# Patient Record
Sex: MALE | Race: BLACK OR AFRICAN AMERICAN | Employment: UNEMPLOYED | URBAN - METROPOLITAN AREA
[De-identification: names, ages, dates, MRNs, and addresses within clinical notes are randomized per-mention and may not be internally consistent; named-entity substitution may affect disease eponyms.]

---

## 2017-11-28 ENCOUNTER — TRANSFERRED RECORDS (OUTPATIENT)
Dept: HEALTH INFORMATION MANAGEMENT | Facility: CLINIC | Age: 57
End: 2017-11-28

## 2018-02-19 ENCOUNTER — TRANSFERRED RECORDS (OUTPATIENT)
Dept: HEALTH INFORMATION MANAGEMENT | Facility: CLINIC | Age: 58
End: 2018-02-19

## 2018-11-15 ENCOUNTER — TRANSFERRED RECORDS (OUTPATIENT)
Dept: HEALTH INFORMATION MANAGEMENT | Facility: CLINIC | Age: 58
End: 2018-11-15

## 2019-01-11 ENCOUNTER — TRANSFERRED RECORDS (OUTPATIENT)
Dept: HEALTH INFORMATION MANAGEMENT | Facility: CLINIC | Age: 59
End: 2019-01-11

## 2019-01-11 ENCOUNTER — TELEPHONE (OUTPATIENT)
Dept: UROLOGY | Facility: CLINIC | Age: 59
End: 2019-01-11

## 2019-01-11 NOTE — TELEPHONE ENCOUNTER
ONCOLOGY INTAKE: Records Information      APPT INFORMATION:  Referring provider:  Corrina  Referring provider s clinic:  [facility]  Reason for visit/diagnosis:  Renal mass:    Were the records received with the referral (via [employer])? Complete    Has patient been seen for any external appt for this diagnosis (enter clinic/location)? Dx:Renal mass: Caller Willy from [facility] [phone] Ref by: Corrina [facility] No Bx No Imaging at [facility] 08/18 CT

## 2019-01-14 ENCOUNTER — PRE VISIT (OUTPATIENT)
Dept: UROLOGY | Facility: CLINIC | Age: 59
End: 2019-01-14

## 2019-01-14 NOTE — TELEPHONE ENCOUNTER
MEDICAL RECORDS REQUEST   [facility]  Urology Clinic  [address] [place] [address]  PHONE: [phone]  Fax: [phone]        FUTURE VISIT INFORMATION                                                   Paul Ramirez, : 1960 scheduled for future visit at [facility]    APPOINTMENT INFORMATION:    Date: 2019    Provider:  LENIN ELMORE    Reason for Visit/Diagnosis: CONSULT FOR RENAL MASS    REFERRAL INFORMATION:    Referring provider:  TAYO ZAVALA    Specialty: MD    Referring providers clinic:  [facility]    Clinic contact number: [phone];    RECORDS REQUESTED FOR VISIT                                                     NOTES  STATUS/DETAILS   OFFICE NOTE from referring provider  yes   OFFICE NOTE from other specialist  no   DISCHARGE SUMMARY from hospital  no   DISCHARGE REPORT from the ER  no   OPERATIVE REPORT  yes   MEDICATION LIST  yes       PRE-VISIT CHECKLIST      Record collection complete Yes   Appointment appropriately scheduled           (right time/right provider) Yes   MyChart activation If no, please explain IN PROCESS   Questionnaire complete If no, please explain IN PROCESS     Completed by: Mervat Mendez

## 2019-01-15 ENCOUNTER — TRANSFERRED RECORDS (OUTPATIENT)
Dept: HEALTH INFORMATION MANAGEMENT | Facility: CLINIC | Age: 59
End: 2019-01-15

## 2019-02-18 ENCOUNTER — OFFICE VISIT (OUTPATIENT)
Dept: UROLOGY | Facility: CLINIC | Age: 59
End: 2019-02-18
Payer: COMMERCIAL

## 2019-02-18 ENCOUNTER — ALLIED HEALTH/NURSE VISIT (OUTPATIENT)
Dept: UROLOGY | Facility: CLINIC | Age: 59
End: 2019-02-18
Payer: COMMERCIAL

## 2019-02-18 VITALS
SYSTOLIC BLOOD PRESSURE: 131 MMHG | HEART RATE: 54 BPM | DIASTOLIC BLOOD PRESSURE: 76 MMHG | BODY MASS INDEX: 23.49 KG/M2 | WEIGHT: 149.7 LBS | HEIGHT: 67 IN

## 2019-02-18 DIAGNOSIS — Z85.528 HX OF RENAL CELL CANCER: Primary | ICD-10-CM

## 2019-02-18 RX ORDER — AMLODIPINE BESYLATE 10 MG/1
10 TABLET ORAL EVERY MORNING
COMMUNITY
Start: 2016-12-05

## 2019-02-18 RX ORDER — DOCUSATE SODIUM 100 MG/1
100 CAPSULE, LIQUID FILLED ORAL EVERY MORNING
COMMUNITY
Start: 2016-12-05

## 2019-02-18 RX ORDER — ACETAMINOPHEN 325 MG/1
650 TABLET ORAL
COMMUNITY
Start: 2019-01-25 | End: 2019-03-08

## 2019-02-18 RX ORDER — CEFAZOLIN SODIUM 1 G/50ML
1 INJECTION, SOLUTION INTRAVENOUS SEE ADMIN INSTRUCTIONS
Status: CANCELLED | OUTPATIENT
Start: 2019-02-18

## 2019-02-18 RX ORDER — CARVEDILOL 12.5 MG/1
12.5 TABLET ORAL 2 TIMES DAILY WITH MEALS
COMMUNITY
Start: 2018-06-29

## 2019-02-18 RX ORDER — CITALOPRAM HYDROBROMIDE 20 MG/1
20 TABLET ORAL EVERY MORNING
COMMUNITY
Start: 2017-01-22

## 2019-02-18 RX ORDER — SIMVASTATIN 10 MG
10 TABLET ORAL EVERY MORNING
COMMUNITY
Start: 2016-12-05

## 2019-02-18 RX ORDER — LABETALOL 100 MG/1
100 TABLET, FILM COATED ORAL EVERY MORNING
Status: ON HOLD | COMMUNITY
Start: 2017-01-22 | End: 2019-03-22

## 2019-02-18 RX ORDER — TRAZODONE HYDROCHLORIDE 100 MG/1
100 TABLET ORAL
COMMUNITY
Start: 2016-12-05

## 2019-02-18 RX ORDER — ASPIRIN 81 MG/1
81 TABLET, CHEWABLE ORAL EVERY MORNING
COMMUNITY
Start: 2018-10-26

## 2019-02-18 RX ORDER — SENNOSIDES A AND B 8.6 MG/1
8.6 TABLET, FILM COATED ORAL
COMMUNITY
Start: 2019-01-16 | End: 2019-03-08

## 2019-02-18 RX ORDER — CEFAZOLIN SODIUM 2 G/50ML
2 SOLUTION INTRAVENOUS
Status: CANCELLED | OUTPATIENT
Start: 2019-02-18

## 2019-02-18 ASSESSMENT — MIFFLIN-ST. JEOR: SCORE: 1467.66

## 2019-02-18 ASSESSMENT — PAIN SCALES - GENERAL: PAINLEVEL: NO PAIN (0)

## 2019-02-18 NOTE — PROGRESS NOTES
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  Evaluation of the literature demonstrates that approximately 80% of enhancing renal masses turn out to be kidney cancer upon removal, while 20% are found to be benign.  Although certain features such as size, gender and tumor characteristics can change these risks.  Predominantly cystic masses tend to have a much lower risk of cancer than solid masses.    We discussed the role of renal mass biopsy including the fact that renal mass biopsy is very safe with current techniques (risk of tumor seeding far below 1%).  Though renal mass biopsy is usually quite accurate 90-95% of the time, it can be inaccurate and it can be non diagnostic.  Furthermore, the majority of patients find they just need to proceed to surgery anyway. Renal mass biopsy can be very useful to determine a possible cancer recurrence (if there was a history of a previous) and if the surgical risks are high due to comorbidities or previous extensive surgery.    We discussed the options for treatment of a localized kidney mass includin) active surveillance   2) thermal ablation   3) surgical extirpation      Surgical extirpation has the best track record with a nearly 99% success rate for T1a lesions/masses compared to 90% success with thermal ablation and is generally preferred.  Furthermore, thermal ablation is not optimal for larger masses (>3 cms) or centrally located masses.  Active surveillance is also a reasonable option when life expectancy is less than 5-7 years for small  renal masses.    Furthermore, we discussed the relative merits of partial nephrectomy vs. radical nephrectomy and the theoretic basis behind maximal nephron preservation.  There is some data suggesting there are long term survival advantages and equivalent cancer control with nephron sparing surgery.  We also discussed the advantages and disadvantages and roles of open surgery vs. laparoscopic (and Da Preeti assisted) surgery.    The anticipated post-operative course was explained, including an anticipated 1 night hospital stay.    Patient has decided he would like to proceed with Robotic Partial Nephrectomy - 30835, significant probably conversion to open given past significant surgical history. Will likely approach this retro.    The risks, benefits, alternatives, and personnel involved in the procedure were discussed.  All questions were answered.  A written informed consent will be finalized on the morning of the procedure.    Chief Complaint:  Solid Renal Mass     HPI:   Paul Ramirez is a very pleasant 58 year old male who presents with a history renal cancer and nephrectomy of a renal lesion/mass.  This was discovered incidentally.  Initially diagnosed about 7 month(s) ago.  The lesion is solid and enhancing.  The maximal dimension of the renal lesion is 1.8 centimeters and located on the left side. Does have a history of prostate cancer which was treated with surgical intervention in 2017 and right renal carcinoma which was also treated with surgical intervention in 2012. Denies any pain or hematuria.     The histologic subtype was Papillary.    ISUP Grade 3.    Pathologic Stage T1b see below*.    Maximal tumor dimension was 4.7 cm and 0.3 cm  Tumor thrombus level  not applicable.    Tumor necrosis present: No.  Sarcomatoid features present: No.  Patient is status post Radical Nephrectomy Open on 2012.   Tumor was on the right side.   Lymph Nodes Removed No.   Number of nodes removed 0, number of node  "positive for cancer 0.  Was the ipsilateral adrenal gland removed? No.  Any Chemotherapy? No.    he does have a history of previous abdominal or retroperitoneal surgery.  There is not a family history of renal cell cancer.  The patient Does have  a history of smoking and currently is smoking one pack every three days.     Review of Systems:   Pertinent items are noted in HPI or as below, remainder of complete ROS is negative.      Active Medications:   The patient denies any regular medication use.      Allergies:   The patient reports no known allergies.       Past Medical History:  Renal cancer  Prostate cancer     Past Surgical History:  Right nephrectomy.   Prostatectomy.    Family History:   No past pertinent family history.       Social History:   Smoker.      PHYSICAL EXAM  Patient is a 58 year old  male   Vitals: Blood pressure 131/76, pulse 54, height 1.702 m (5' 7\"), weight 67.9 kg (149 lb 11.2 oz).  General Appearance Adult: Alert, no acute distress, oriented  HENT: throat/mouth:normal, good dentition  Lungs: no respiratory distress, or pursed lip breathing  Heart: No obvious jugular venous distension present  Abdomen: Body mass index is 23.45 kg/m .  Musculoskeletal: extremities normal  Skin: Multiple previous healed surgical incisions on the abdomen including the midline, umbilical, right upper quadrant, and median sternally on the chest and right subcostal.   Neuro: Alert, oriented, speech and mentation normal  Psych: affect and mood normal  Gait: Normal  : deferred    Laboratory:   I personally reviewed all applicable laboratory and pathology data reviewed with patient    Imaging:   I personally viewed all applicable imaging and interpreted them and went over the results with the patient.  Mass/lesion details are as follows:     The mass/lesion is located in the Left side and is primarily located in the lower pole.  The tumor is also 50 % endophytic and exophytic.  The mass/lesion primarily lies on " the anterior surface.  With regard to the collecting system, the edge of the tumor arrives between 4-7 mm from the collecting system.    I spent 10 minutes reviewing outside and past medical records    Scribe Disclosure:   I, Ramiro Espinosa, am serving as a scribe to document services personally performed by Tyson Velasquez MD at this visit, based upon the provider's statements to me. All documentation has been reviewed by the aforementioned provider prior to being entered into the official medical record.     Portions of this medical record were completed by a scribe. UPON MY REVIEW AND AUTHENTICATION BY ELECTRONIC SIGNATURE, this confirms (a) I performed the applicable clinical services, and (b) the record is accurate.      Answers for HPI/ROS submitted by the patient on 2/18/2019   General Symptoms: No  Skin Symptoms: No  HENT Symptoms: No  EYE SYMPTOMS: No  HEART SYMPTOMS: No  LUNG SYMPTOMS: No  INTESTINAL SYMPTOMS: No  URINARY SYMPTOMS: No  REPRODUCTIVE SYMPTOMS: No  SKELETAL SYMPTOMS: No  BLOOD SYMPTOMS: No  NERVOUS SYSTEM SYMPTOMS: No  MENTAL HEALTH SYMPTOMS: No

## 2019-02-18 NOTE — NURSING NOTE
"Chief Complaint   Patient presents with     Consult For     renal mass       Blood pressure 131/76, pulse 54, height 1.702 m (5' 7\"), weight 67.9 kg (149 lb 11.2 oz). Body mass index is 23.45 kg/m .    There is no problem list on file for this patient.      Allergies   Allergen Reactions     Lisinopril Unknown       Current Outpatient Medications   Medication Sig Dispense Refill     acetaminophen (TYLENOL) 325 MG tablet Take 650 mg by mouth       amLODIPine (NORVASC) 10 MG tablet Take 10 mg by mouth       aspirin (ASA) 81 MG chewable tablet Take 81 mg by mouth       carvedilol (COREG) 12.5 MG tablet Take 12.5 mg by mouth       Cholecalciferol (VITAMIN D3) 1000 units CAPS Take 1,000 Units by mouth       citalopram (CELEXA) 20 MG tablet Take 20 mg by mouth       docusate sodium (DSS) 100 MG capsule Take 100 mg by mouth       labetalol (NORMODYNE) 100 MG tablet Take 50 mg by mouth       senna (SENOKOT) 8.6 MG tablet Take 8.6 mg by mouth       simvastatin (ZOCOR) 10 MG tablet Take 10 mg by mouth       traZODone (DESYREL) 100 MG tablet Take 100 mg by mouth       vitamin D3 (CHOLECALCIFEROL) 1000 units (25 mcg) tablet Take 1,000 Units by mouth         Social History     Tobacco Use     Smoking status: Current Every Day Smoker   Substance Use Topics     Alcohol use: Not on file     Drug use: Not on file       Shirin Harrison LPN  2/18/2019  10:39 AM     "

## 2019-02-18 NOTE — PROGRESS NOTES
Pre Op Teaching Flowsheet       Pre and Post op Patient Education  Relevant Diagnosis:  Renal mass  Surgical procedure:  Left robotic partial nephrectomy  Teaching Topic:  Pre and post op teaching  Person Involved in teaching: Paul Ramirez    Motivation Level:  Asks Questions: Yes  Eager to Learn:  Yes  Cooperative: Yes  Receptive (willing/able to accept information):  Yes    Patient demonstrates understanding of the following:  Date of surgery:  3/22/19  Location of surgery:  [address] [place] [address]  History and Physical and any other testing necessary prior to surgery: Yes  Required time line for completion of History and Physical and any pre-op testing: Yes    Patient demonstrates understanding of the following:  Pre-op bowel prep:  No  Pre-op showering/scrub information with PCMX Soap: Yes  Blood thinner medications discussed and when to stop (if applicable):  Yes      Infection Prevention:   Patient demonstrates understanding of the following:  Surgical procedure site care taught: Yes  Signs and symptoms of infection taught:  Yes      Post-op follow-up:  Discussed how to contact the hospital, nurse, and clinic scheduling staff if necessary.    Instructional materials used/given/mailed:  [facility] Booklet, post op teaching sheet, Map, Soap, and arrival/location information.    Surgical instructions packet given to patient in office:  Yes.

## 2019-02-18 NOTE — LETTER
2019       RE: Paul Ramirez  [address]  [place] [address]     Dear Colleague,    Thank you for referring your patient, Paul Ramirez, to the [facility] UROLOGY AND INST FOR PROSTATE AND UROLOGIC CANCERS at [facility]. Please see a copy of my visit note below.      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  Evaluation of the literature demonstrates that approximately 80% of enhancing renal masses turn out to be kidney cancer upon removal, while 20% are found to be benign.  Although certain features such as size, gender and tumor characteristics can change these risks.  Predominantly cystic masses tend to have a much lower risk of cancer than solid masses.    We discussed the role of renal mass biopsy including the fact that renal mass biopsy is very safe with current techniques (risk of tumor seeding far below 1%).  Though renal mass biopsy is usually quite accurate 90-95% of the time, it can be inaccurate and it can be non diagnostic.  Furthermore, the majority of patients find they just need to proceed to surgery anyway. Renal mass biopsy can be very useful to determine a possible cancer recurrence (if there was a history of a previous) and if the surgical risks are high due to comorbidities or previous extensive surgery.    We discussed the options for treatment of a localized kidney mass includin) active surveillance   2) thermal ablation   3) surgical extirpation      Surgical extirpation has the best track record with a  nearly 99% success rate for T1a lesions/masses compared to 90% success with thermal ablation and is generally preferred.  Furthermore, thermal ablation is not optimal for larger masses (>3 cms) or centrally located masses.  Active surveillance is also a reasonable option when life expectancy is less than 5-7 years for small renal masses.    Furthermore, we discussed the relative merits of partial nephrectomy vs. radical nephrectomy and the theoretic basis behind maximal nephron preservation.  There is some data suggesting there are long term survival advantages and equivalent cancer control with nephron sparing surgery.  We also discussed the advantages and disadvantages and roles of open surgery vs. laparoscopic (and Da Preeti assisted) surgery.    The anticipated post-operative course was explained, including an anticipated 1 night hospital stay.    Patient has decided he would like to proceed with Robotic Partial Nephrectomy - 48643, significant probably conversion to open given past significant surgical history. Will likely approach this retro.    The risks, benefits, alternatives, and personnel involved in the procedure were discussed.  All questions were answered.  A written informed consent will be finalized on the morning of the procedure.    Chief Complaint:  Solid Renal Mass     HPI:   Paul Ramirez is a very pleasant 58 year old male who presents with a history renal cancer and nephrectomy of a renal lesion/mass.  This was discovered incidentally.  Initially diagnosed about 7 month(s) ago.  The lesion is solid and enhancing.  The maximal dimension of the renal lesion is 1.8 centimeters and located on the left side. Does have a history of prostate cancer which was treated with surgical intervention in 2017 and right renal carcinoma which was also treated with surgical intervention in 2012. Denies any pain or hematuria.     The histologic subtype was Papillary.    ISUP Grade 3.    Pathologic Stage T1b see  "below*.    Maximal tumor dimension was 4.7 cm and 0.3 cm  Tumor thrombus level  not applicable.    Tumor necrosis present: No.  Sarcomatoid features present: No.  Patient is status post Radical Nephrectomy Open on 2012.   Tumor was on the right side.   Lymph Nodes Removed No.   Number of nodes removed 0, number of node positive for cancer 0.  Was the ipsilateral adrenal gland removed? No.  Any Chemotherapy? No.    he does have a history of previous abdominal or retroperitoneal surgery.  There is not a family history of renal cell cancer.  The patient Does have  a history of smoking and currently is smoking one pack every three days.     Review of Systems:   Pertinent items are noted in HPI or as below, remainder of complete ROS is negative.      Active Medications:   The patient denies any regular medication use.      Allergies:   The patient reports no known allergies.       Past Medical History:  Renal cancer  Prostate cancer     Past Surgical History:  Right nephrectomy.   Prostatectomy.    Family History:   No past pertinent family history.       Social History:   Smoker.      PHYSICAL EXAM  Patient is a 58 year old  male   Vitals: Blood pressure 131/76, pulse 54, height 1.702 m (5' 7\"), weight 67.9 kg (149 lb 11.2 oz).  General Appearance Adult: Alert, no acute distress, oriented  HENT: throat/mouth:normal, good dentition  Lungs: no respiratory distress, or pursed lip breathing  Heart: No obvious jugular venous distension present  Abdomen: Body mass index is 23.45 kg/m .  Musculoskeletal: extremities normal  Skin: Multiple previous healed surgical incisions on the abdomen including the midline, umbilical, right upper quadrant, and median sternally on the chest and right subcostal.   Neuro: Alert, oriented, speech and mentation normal  Psych: affect and mood normal  Gait: Normal  : deferred    Laboratory:   I personally reviewed all applicable laboratory and pathology data reviewed with patient    Imaging:   I " personally viewed all applicable imaging and interpreted them and went over the results with the patient.  Mass/lesion details are as follows:     The mass/lesion is located in the Left side and is primarily located in the lower pole.  The tumor is also 50 % endophytic and exophytic.  The mass/lesion primarily lies on the anterior surface.  With regard to the collecting system, the edge of the tumor arrives between 4-7 mm from the collecting system.    I spent 10 minutes reviewing outside and past medical records    Scribe Disclosure:   I, Ramiro Espinosa, am serving as a scribe to document services personally performed by Tyson Velasquez MD at this visit, based upon the provider's statements to me. All documentation has been reviewed by the aforementioned provider prior to being entered into the official medical record.     Portions of this medical record were completed by a scribe. UPON MY REVIEW AND AUTHENTICATION BY ELECTRONIC SIGNATURE, this confirms (a) I performed the applicable clinical services, and (b) the record is accurate.      Answers for HPI/ROS submitted by the patient on 2/18/2019   General Symptoms: No  Skin Symptoms: No  HENT Symptoms: No  EYE SYMPTOMS: No  HEART SYMPTOMS: No  LUNG SYMPTOMS: No  INTESTINAL SYMPTOMS: No  URINARY SYMPTOMS: No  REPRODUCTIVE SYMPTOMS: No  SKELETAL SYMPTOMS: No  BLOOD SYMPTOMS: No  NERVOUS SYSTEM SYMPTOMS: No  MENTAL HEALTH SYMPTOMS: No      Again, thank you for allowing me to participate in the care of your patient.      Sincerely,    Tyson Velasquez MD

## 2019-03-07 PROBLEM — M79.10 MUSCULAR PAIN: Status: ACTIVE | Noted: 2018-11-15

## 2019-03-07 PROBLEM — F17.200 TOBACCO USE DISORDER: Status: ACTIVE | Noted: 2017-04-27

## 2019-03-07 PROBLEM — R21 RASH AND NONSPECIFIC SKIN ERUPTION: Status: ACTIVE | Noted: 2018-11-15

## 2019-03-07 PROBLEM — D75.9 CYTOPENIA: Status: ACTIVE | Noted: 2017-11-24

## 2019-03-07 PROBLEM — I71.20 THORACIC AORTIC ANEURYSM WITHOUT RUPTURE (H): Status: ACTIVE | Noted: 2019-03-07

## 2019-03-07 PROBLEM — N52.31 ERECTILE DYSFUNCTION FOLLOWING RADICAL PROSTATECTOMY: Status: ACTIVE | Noted: 2017-11-24

## 2019-03-07 PROBLEM — G89.18 POST-OP PAIN: Status: ACTIVE | Noted: 2019-01-29

## 2019-03-07 PROBLEM — E78.2 MIXED HYPERLIPIDEMIA: Status: ACTIVE | Noted: 2017-03-29

## 2019-03-07 PROBLEM — Z96.0 HISTORY OF PENILE IMPLANT: Status: ACTIVE | Noted: 2019-01-18

## 2019-03-07 PROBLEM — G47.01 INSOMNIA DUE TO MEDICAL CONDITION: Status: ACTIVE | Noted: 2017-03-29

## 2019-03-07 PROBLEM — J95.821 ACUTE POSTOPERATIVE RESPIRATORY FAILURE (H): Status: ACTIVE | Noted: 2017-01-03

## 2019-03-07 PROBLEM — R63.4 WEIGHT LOSS: Status: ACTIVE | Noted: 2018-05-25

## 2019-03-07 PROBLEM — N17.9 AKI (ACUTE KIDNEY INJURY) (H): Status: ACTIVE | Noted: 2017-01-03

## 2019-03-08 ENCOUNTER — OFFICE VISIT (OUTPATIENT)
Dept: SURGERY | Facility: CLINIC | Age: 59
End: 2019-03-08
Payer: COMMERCIAL

## 2019-03-08 ENCOUNTER — ANESTHESIA EVENT (OUTPATIENT)
Dept: SURGERY | Facility: CLINIC | Age: 59
End: 2019-03-08
Payer: COMMERCIAL

## 2019-03-08 VITALS
HEIGHT: 67 IN | SYSTOLIC BLOOD PRESSURE: 150 MMHG | OXYGEN SATURATION: 100 % | TEMPERATURE: 98.1 F | DIASTOLIC BLOOD PRESSURE: 85 MMHG | WEIGHT: 154.2 LBS | HEART RATE: 54 BPM | RESPIRATION RATE: 18 BRPM | BODY MASS INDEX: 24.2 KG/M2

## 2019-03-08 DIAGNOSIS — C64.9 RENAL CELL CANCER (H): ICD-10-CM

## 2019-03-08 DIAGNOSIS — N28.89 RENAL MASS: ICD-10-CM

## 2019-03-08 DIAGNOSIS — N28.89 RENAL MASS: Primary | ICD-10-CM

## 2019-03-08 DIAGNOSIS — C64.9 RENAL CELL CANCER (H): Primary | ICD-10-CM

## 2019-03-08 DIAGNOSIS — Z01.818 PREOP EXAMINATION: ICD-10-CM

## 2019-03-08 DIAGNOSIS — Z85.528 HX OF RENAL CELL CANCER: ICD-10-CM

## 2019-03-08 LAB
ALBUMIN UR-MCNC: NEGATIVE MG/DL
ANION GAP SERPL CALCULATED.3IONS-SCNC: 5 MMOL/L (ref 3–14)
APPEARANCE UR: CLEAR
BILIRUB UR QL STRIP: NEGATIVE
BUN SERPL-MCNC: 18 MG/DL (ref 7–30)
CALCIUM SERPL-MCNC: 10.2 MG/DL (ref 8.5–10.1)
CHLORIDE SERPL-SCNC: 105 MMOL/L (ref 94–109)
CO2 SERPL-SCNC: 28 MMOL/L (ref 20–32)
COLOR UR AUTO: YELLOW
CREAT SERPL-MCNC: 1.04 MG/DL (ref 0.66–1.25)
ERYTHROCYTE [DISTWIDTH] IN BLOOD BY AUTOMATED COUNT: 16.7 % (ref 10–15)
GFR SERPL CREATININE-BSD FRML MDRD: 80 ML/MIN/{1.73_M2}
GLUCOSE SERPL-MCNC: 104 MG/DL (ref 70–99)
GLUCOSE UR STRIP-MCNC: NEGATIVE MG/DL
HCT VFR BLD AUTO: 36.8 % (ref 40–53)
HGB BLD-MCNC: 12.2 G/DL (ref 13.3–17.7)
HGB UR QL STRIP: NEGATIVE
KETONES UR STRIP-MCNC: NEGATIVE MG/DL
LEUKOCYTE ESTERASE UR QL STRIP: NEGATIVE
MCH RBC QN AUTO: 30.7 PG (ref 26.5–33)
MCHC RBC AUTO-ENTMCNC: 33.2 G/DL (ref 31.5–36.5)
MCV RBC AUTO: 93 FL (ref 78–100)
MUCOUS THREADS #/AREA URNS LPF: PRESENT /LPF
NITRATE UR QL: NEGATIVE
PH UR STRIP: 6 PH (ref 5–7)
PLATELET # BLD AUTO: 193 10E9/L (ref 150–450)
POTASSIUM SERPL-SCNC: 4.2 MMOL/L (ref 3.4–5.3)
RBC # BLD AUTO: 3.97 10E12/L (ref 4.4–5.9)
RBC #/AREA URNS AUTO: <1 /HPF (ref 0–2)
SODIUM SERPL-SCNC: 138 MMOL/L (ref 133–144)
SOURCE: ABNORMAL
SP GR UR STRIP: 1.01 (ref 1–1.03)
UROBILINOGEN UR STRIP-MCNC: 0 MG/DL (ref 0–2)
WBC # BLD AUTO: 5 10E9/L (ref 4–11)
WBC #/AREA URNS AUTO: <1 /HPF (ref 0–5)

## 2019-03-08 RX ORDER — ACETAMINOPHEN 500 MG
500 TABLET ORAL EVERY MORNING
COMMUNITY

## 2019-03-08 ASSESSMENT — LIFESTYLE VARIABLES: TOBACCO_USE: 1

## 2019-03-08 ASSESSMENT — MIFFLIN-ST. JEOR: SCORE: 1488.08

## 2019-03-08 NOTE — PATIENT INSTRUCTIONS
Preparing for Your Surgery      Name:  Paul Ramirez   MRN:  [id number]   :  1960   Today's Date:  3/8/2019     Arriving for surgery:  Surgery date:  2019  Arrival time: 5:30 am   Please come to:       [facility] [address]  [address] [place]  [address]    -   parking is available in front of the hospital from 5:15 am to 8:00 pm    -  Stop at the Information Desk in the lobby    -   Inform the information person that you are here for surgery. An escort to 3c will be provided. If you would not like an escort, please proceed to 3C on the 3rd floor. [phone]     What can I eat or drink?  -  You may have solid food or milk products until 8 hours prior to your surgery. (3/21/19 at 11 pm)  -  You may have water, apple juice or 7up/Sprite until 2 hours prior to your surgery. (until 5:30 am arrival time)    Which medicines can I take?        Stop Aspirin, vitamins and supplements one week prior to surgery.      Hold Ibuprofen for 24 hours and/or Naproxen for 48 hours prior to surgery.     -  Please take these medications the day of surgery:      All usual am prescription medications       How do I prepare myself?  -  Take two showers: one the night before surgery; and one the morning of surgery.         Use Scrubcare or Hibiclens to wash from neck down.  You may use your own shampoo and conditioner. No other hair products.   -  Do NOT use lotion, powder, deodorant, or antiperspirant the day of your surgery.  -  Do NOT wear any makeup, fingernail polish or jewelry.  - Do not bring your own medications to the hospital, except for inhalers and eye drops.  -  Bring your ID and insurance card.    Questions or Concerns:  -If you have questions or concerns regarding the day of surgery, please call [phone].       -For questions after surgery please call your surgeons office.           Influenza visitor restrictions are in force at this time.  The [facility] is  prohibiting the following visitors:  -Any sick persons regardless of age  -Anyone with known exposure to a communicable illness  -Anyone under the age of 5    AFTER YOUR SURGERY  Breathing exercises   Breathing exercises help you recover faster. Take deep breaths and let the air out slowly. This will:     Help you wake up after surgery.    Help prevent complications like pneumonia.  Preventing complications will help you go home sooner.   We may give you a breathing device (incentive spirometer) to encourage you to breathe deeply.   Nausea and vomiting   You may feel sick to your stomach after surgery; if so, let your nurse know.    Pain control:  After surgery, you may have pain. Our goal is to help you manage your pain. Pain medicine will help you feel comfortable enough to do activities that will help you heal.  These activities may include breathing exercises, walking and physical therapy.   To help your health care team treat your pain we will ask: 1) If you have pain  2) where it is located 3) describe your pain in your words  Methods of pain control include medications given by mouth, vein or by nerve block for some surgeries.  We may give you a pain control pump that will:  1) Deliver the medicine through a tube placed in your vein  2) Control the amount of medicine you receive  3) Allow you to push a button to deliver a dose of pain medicine  Sequential Compression Device (SCD) or Pneumo Boots:  You may need to wear SCD S on your legs or feet. These are wraps connected to a machine that pumps in air and releases it. The repeated pumping helps prevent blood clots from forming.

## 2019-03-08 NOTE — ANESTHESIA PREPROCEDURE EVALUATION
Anesthesia Pre-Procedure Evaluation    Patient: Paul Ramirez   MRN:     [id number] Gender:   male   Age:    58 year old :      1960        Preoperative Diagnosis: History Of Renal Cell Cancer   Procedure(s):  DaVinci Assisted Left Partial Nephrectomy     Past Medical History:   Diagnosis Date     Adrenal nodule (H) 2012    Overview:  Adrenal hypertrophy on right and adrenal nodule, 1.2 cm on left. Nonsecreting. Stable size 4/15- adrenal nodule stable with unchanged adrenal hyperplasia 9/15-MBD: Primary hyperparathyroidism with known parathyroid adenoma, normal bone density testing. He does not seem overtly interested in surgical treatments for this again given his DEXA and more mild hypercalcemia as of late, it woul     Alcohol abuse 2015    Last Assessment & Plan:  No alcohol use x 2 weeks, LFT's improved Will continue to hold naltrexone ( current short course of opioids for acute injury )      Carcinoma of kidney (H) 2012    Overview:  Overview:  Recommend chest imaging q 6 mos ( due ) and abdominal imaging q 2 years ( due )   Last Assessment & Plan:  -S/p R nephrectomy  -Had been on surveillance. Has had intermittent coccygeal pain and elevated LFTs in , which had then normalized. XR and CT negative for metastases. Today, denies coccygeal pain. No bone pains. CT in ,  and  negative for rec     Carcinoma of prostate (H) 2015    Overview:  S/p radical prostatectomy 11/30/15 with focally positive margins Urology f/u due    Last Assessment & Plan:  - Stage II (T2c, N0, M0), bear's 3+4,S/p radical prostatectomy 11/30/15. -Saw urology in 2017 with very slight increase in PSA.  Imaging at the time showed no local recurrence. Met with radiation oncology 2017 with a plan to continue to monitor PSA. -F/U with Dr. Altamirano     DM (diabetes mellitus) (H)      History of cocaine abuse      HTN (hypertension)      Hyperparathyroidism (H)      Renal  insufficiency      Renal mass      Thoracic aortic aneurysm without rupture (H) 3/7/2019     Tobacco dependence syndrome 1/17/2012      Past Surgical History:   Procedure Laterality Date     IR CVC TUNNEL PLACEMENT > 5 YRS OF AGE  2017     IR embolization left renal mass   2016     IR LUMBAR DRAIN PLACEMENT W FLUORO  2017     NEPHRECTOMY Right 2012     PROSTATECTOMY RETROPUBIC RADICAL  2016     Replacement of thoracoabdominal aorta  2007    [facility]     THORACENTESIS  2017          Anesthesia Evaluation     . Pt has had prior anesthetic. Type: General    No history of anesthetic complications          ROS/MED HX    ENT/Pulmonary:     (+)CHERIE risk factors hypertension, tobacco use, Current use 1 pack every 3 days packs/day  , . .    Neurologic:  - neg neurologic ROS     Cardiovascular: Comment: CT aorta: 1. Increasing size of an enhancing lesion in the anterior cortex of the left kidney, concerning for renal cell carcinoma.  2. No significant change in the appearance of the aorta, status post descending thoracic aortic aneurysm repair. Persistent chronic abdominal aortic dissection arising at the level of the diaphragm and extending into the left external iliac artery.  3. Stable postoperative changes from right nephrectomy and prostatectomy without evidence of significant localized recurrence.    Type 1 thoracic aortic aneurysm dissection repair with valve sparing procedure, and then in 2017 he had repair of distal dissection.     (+) Dyslipidemia, hypertension-Peripheral Vascular Disease-- Other, --. Taking blood thinners Pt has received instructions: Instructions Given to patient: Planned 7 day hold ASA for surgery. . . :. . Previous cardiac testing Echodate:2017results: 1. Technically limited exam.   2. Echo contrast was administrered to enhance visualization of all left ventricular segments.   3. Normal LV size, borderline wall thickness, normal global systolic function with an estimated EF of 55 - 60%.  Abnormal septal motion likely due to conduction.   4. Right ventricular cavity size is normal, global systolic RV function is normal.Stress Testdate:2016 results:1. No ischemia at the cardiac workload achieved.  2. Left ventricular function normal at rest, improved with stress.  3. No ischemic ECG response with adequate heart rate.  4. The patient did not report angina with stress.  5. Exercise capacity was fair .  6. The baseline echocardiogram revealed no significant valvular  abnormalities.  7. The PA systolic pressure was 29 mm Hg + RA at rest.ECG reviewed date:11/15/18 results:SUPRAVENTRICULAR BRADYCARDIAsimilar to 9/13 ECG  VOLTAGE CRITERIA FOR LVH (MEETS CRITERIA IN ONE OF: R(aVL), S(V1), R(V5), R(V5/V6)+S(V1))similar to 9/13 ECG  NONSPECIFIC T-WAVE ABNORMALITYsimilar to 9/13 ECG   date: results:          METS/Exercise Tolerance:  4 - Raking leaves, gardening   Hematologic:     (+) History of Transfusion no previous transfusion reaction -      Musculoskeletal:  - neg musculoskeletal ROS       GI/Hepatic:     (+) liver disease (steato),       Renal/Genitourinary:     (+) chronic renal disease, type: CRI, Pt does not require dialysis, Pt has no history of transplant,       Endo: Comment: 1* hyperparathyroid    (+) type II DM (Diet controlled) Last HgA1c: 4.8 date: 10/29/18 Not using insulin - not using insulin pump .      Psychiatric:     (+) psychiatric history depression      Infectious Disease:  - neg infectious disease ROS       Malignancy:   (+) Malignancy History of Prostate and Other  Prostate CA Remission status post Surgery, Other CA papillary adeno s/p right nephrectomy, now left renal mass Active status post Surgery         Other: Comment: Hx cocaine--remote   (+) No chance of pregnancy C-spine cleared: N/A, no H/O Chronic Pain,no other significant disability                        PHYSICAL EXAM:   Mental Status/Neuro: A/A/O; Age Appropriate   Airway: Facies: Feasible  Mallampati: II  Mouth/Opening:  "Full  TM distance: > 6 cm  Neck ROM: Full   Respiratory: Auscultation: CTAB     Resp. Rate: Normal     Resp. Effort: Normal      CV: Rhythm: Regular  Heart: Normal Sounds   Comments:      Dental:  Dentures: Upper; Lower; Partial                  No results found for: WBC, HGB, HCT, PLT, CRP, SED, NA, POTASSIUM, CHLORIDE, CO2, BUN, CR, GLC, KAVON, PHOS, MAG, ALBUMIN, PROTTOTAL, ALT, AST, GGT, ALKPHOS, BILITOTAL, BILIDIRECT, LIPASE, AMYLASE, CARMEL, PTT, INR, FIBR, TSH, T4, T3, HCG, HCGS, CKTOTAL, CKMB, TROPN    Preop Vitals  BP Readings from Last 3 Encounters:   02/18/19 131/76    Pulse Readings from Last 3 Encounters:   02/18/19 54      Resp Readings from Last 3 Encounters:   No data found for Resp    SpO2 Readings from Last 3 Encounters:   No data found for SpO2      Temp Readings from Last 1 Encounters:   No data found for Temp    Ht Readings from Last 1 Encounters:   02/18/19 1.702 m (5' 7\")      Wt Readings from Last 1 Encounters:   02/18/19 67.9 kg (149 lb 11.2 oz)    Estimated body mass index is 23.45 kg/m  as calculated from the following:    Height as of 2/18/19: 1.702 m (5' 7\").    Weight as of 2/18/19: 67.9 kg (149 lb 11.2 oz).     LDA:            Assessment:   ASA SCORE: 3    NPO Status: > 6 hours since completed Solid Foods   Documentation: H&P complete; Preop Testing complete; Consents complete   Proceeding: Proceed without further delay  Tobacco Use:  NO Active use of Tobacco/UNKNOWN Tobacco use status     Plan:   Anes. Type:  General   Pre-Induction: Midazolam IV (neurontin, kytril, ultram)   Induction:  IV (Standard)   Airway: Oral ETT   Access/Monitoring: PIV; 2nd PIV; A-Line   Maintenance: Balanced   Emergence: Procedure Site   Logistics: Same Day Surgery     Postop Pain/Sedation Strategy:  Standard-Options: Opioids PRN     PONV Management:  Adult Risk Factors:, Non-Smoker, Postop Opioids  Prevention: Granisetron; Other (neurontin)     CONSENT: Direct conversation   Plan and risks discussed with: " Patient   Blood Products: Consented (ALL Blood Products)                  PAC Discussion and Assessment    ASA Classification: 3  Case is suitable for: [place]  Anesthetic techniques and relevant risks discussed: GA and GA with regional block for post-op pain control  Invasive monitoring and risk discussed: No  Types:   Possibility and Risk of blood transfusion discussed: Yes  NPO instructions given:   Additional anesthetic preparation and risks discussed:   Needs early admission to pre-op area:   Other:     PAC Resident/NP Anesthesia Assessment:  Paul Ramirez is a 58 year old male scheduled to undergo Left Robotic Partial Nephrectomy with Dr. Velasquez on 3/22/19. He has the following specific operative considerations:   - RCRI : 0.9% risk of major adverse cardiac event.   - VTE risk: 3%  - CHERIE # of risks 3/8 = Intermediate risk  - Risk of PONV score = 2.  If > 2, anti-emetic intervention recommended.    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 Above procedure planned.   -HLD. Simvastatin. HTN Will take amlodipine, carvedilol, and labetalol on DOS. History of type 1 thoracic aortic aneurysm dissection repair with valve sparing procedure 2007, and then in 2017 he had a distal dissection repair. Denies chest or back pain or palpitations. All testing above. Able to walk distance. Planned 7 day hold for aspirin.   -Continued smoking 1 PPD every 3 days. Denies pulmonary symptoms.   -CKD Cr normal today.  -Depression. Will take citalopram on DOS.  -Past history of blood transfusion. Type and screen drawn today.     2015 Intubation OSH  AIRWAY/INTUBATION PROCEDURE   direct laryngoscopy (Type: Surgical Anesthesia)Process/Method:   sedated and paralyzed   Indications for procedure: surgery  Assessment: vocal cords open and clear and TMD >3 finger breadths  Preoxygenation: mask    Device Device used: MAC   Blade size: 3  The patient was  intubated with a 7.5 mm standard endotracheal   tube  inflated to seal and secured at 23 cm to Lips  Grade: I  Sellicks not used    Narrative  1 intubation attempt(s) confirmed in 0-30 sec   Intubation Assessment: +ETCO2, EBBS and fog in ETT  Ease of masking (I-easy to IV-difficult): I  Ease of intubation (I-easy to IV-difficult): I  Dentition Assessment: poor dentition, dentition unchanged, oral   mucosa unchanged and dentures  Discussed with Dr. Chou.    Reviewed and Signed by PAC Mid-Level Provider/Resident  Mid-Level Provider/Resident: SOHAM Ponce, CNS  Date: 3/8/19  Time: 10:30am    Attending Anesthesiologist Anesthesia Assessment:  I have examined the patient and reviewed the medical record.  I have discussed the patient with the JUAN ANTONIO and concur with her assessment  The patient is scheduled for left robotic partial nephrectomy for renal cell CA in the setting of previous right sided nephrectomy.  He has history of repair of Type 1 dissecting thoracic aortic dissection followed in 2017 by repair of descending portion of thoracic aneurysm.  CT 8/2018 showed stable repair  He is active without cardiac symptoms.  He had stress echo 2016 that was negative for ischemia and echo 2017 that was normal with EF 55%  He denies pulmonary symptoms but has ongoing smoking history (advised to stop)  He has normal Cr (1.04) and GFR (80)    No further testing necessary per protocol.  Will T&S  Final plan per attending anesthesiologist the day of surgery.          Reviewed and Signed by PAC Anesthesiologist  Anesthesiologist: Bryce Chou MD  Date: 3/8/2019  Time:   Pass/Fail:   Disposition:     PAC Pharmacist Assessment:        Pharmacist:   Date:   Time:        SOHAM Flynn CNS

## 2019-03-08 NOTE — H&P
Pre-Operative H & P     CC:  Preoperative exam to assess for increased cardiopulmonary risk while undergoing surgery and anesthesia.    Date of Encounter: 3/8/2019  Primary Care Physician:  Amirah Mann  Reason for visit: Hx of renal cell cancer [Z85.528]  - Primary, left renal mass  HPI  Paul Ramirez is a 58 year old male who presents for pre-operative H & P in preparation for Left Robotic Partial Nephrectomy with Dr. Velasquez on 3/22/19 at [facility]. History is obtained from the patient.     Patient who was recently evaluated by Dr. Velasquez regarding a new enlarging, enhancing left renal mass concerning for renal cell cancer. 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 He was counseled for above procedure.     Patient's history is otherwise significant for HLD, HTN, prior type 1 thoracic aortic aneurysm dissection repair with valve sparing procedure in 2007, and then in 2017 he had repair of a distal dissection. He has been followed by Cardiology, Dr. Wood Hayden, last seen 8/17/18. CTA stable at that time. He is also s/p prostatectomy in 2016.    Past Medical History  Past Medical History:   Diagnosis Date     Adrenal nodule (H) 03/22/2012    Overview:  Adrenal hypertrophy on right and adrenal nodule, 1.2 cm on left. Nonsecreting. Stable size 4/15- adrenal nodule stable with unchanged adrenal hyperplasia 9/15-MBD: Primary hyperparathyroidism with known parathyroid adenoma, normal bone density testing. He does not seem overtly interested in surgical treatments for this again given his DEXA and more mild hypercalcemia as of late, it woul     Alcohol abuse 8/21/2015    Last Assessment & Plan:  No alcohol use x 2 weeks, LFT's improved Will continue to hold naltrexone ( current short course of opioids for acute injury )      Carcinoma of kidney (H) 1/12/2012    Overview:  Overview:  Recommend chest imaging q  6 mos ( due 9/16) and abdominal imaging q 2 years ( due 8/16)   Last Assessment & Plan:  -S/p R nephrectomy 2012 -Had been on surveillance. Has had intermittent coccygeal pain and elevated LFTs in 2015, which had then normalized. XR and CT negative for metastases. Today, denies coccygeal pain. No bone pains. CT in 2013, 2014 and 2015 negative for rec     Carcinoma of prostate (H) 9/22/2015    Overview:  S/p radical prostatectomy 11/30/15 with focally positive margins Urology f/u due 11/18   Last Assessment & Plan:  - Stage II (T2c, N0, M0), bear's 3+4,S/p radical prostatectomy 11/30/15. -Saw urology in 5/2017 with very slight increase in PSA.  Imaging at the time showed no local recurrence. Met with radiation oncology 6/2017 with a plan to continue to monitor PSA. -F/U with Dr. Altamirano     DM (diabetes mellitus) (H)      History of cocaine abuse      HTN (hypertension)      Hyperparathyroidism (H)      Renal insufficiency      Renal mass      Thoracic aortic aneurysm without rupture (H) 3/7/2019     Tobacco dependence syndrome 1/17/2012       Past Surgical History  Past Surgical History:   Procedure Laterality Date     IR CVC TUNNEL PLACEMENT > 5 YRS OF AGE  2017     IR embolization left renal mass   2016     IR LUMBAR DRAIN PLACEMENT W FLUORO  2017     NEPHRECTOMY Right 2012     PROSTATECTOMY RETROPUBIC RADICAL  2016     Replacement of thoracoabdominal aorta  2007    [facility]     THORACENTESIS  2017       Hx of Blood transfusions/reactions: Yes, no known reactions.     Hx of abnormal bleeding or anti-platelet use: ASA 81 mg daily    Menstrual history: No LMP for male patient.    Steroid use in the last year: Denies.     Personal or FH with difficulty with Anesthesia:  Denies.     Prior to Admission Medications  Current Outpatient Medications   Medication Sig Dispense Refill     acetaminophen (TYLENOL) 500 MG tablet Take 500 mg by mouth every morning       amLODIPine (NORVASC) 10 MG tablet Take 10 mg by mouth  every morning        aspirin (ASA) 81 MG chewable tablet Take 81 mg by mouth every morning        carvedilol (COREG) 12.5 MG tablet Take 12.5 mg by mouth 2 times daily (with meals)        Cholecalciferol (VITAMIN D3) 1000 units CAPS Take 1,000 Units by mouth every morning        citalopram (CELEXA) 20 MG tablet Take 20 mg by mouth every morning        docusate sodium (DSS) 100 MG capsule Take 100 mg by mouth every morning        labetalol (NORMODYNE) 100 MG tablet Take 100 mg by mouth every morning        simvastatin (ZOCOR) 10 MG tablet Take 10 mg by mouth every morning        traZODone (DESYREL) 100 MG tablet Take 100 mg by mouth nightly as needed          Allergies  Allergies   Allergen Reactions     Lisinopril Unknown     Other reaction(s): Angioedema       Social History  Social History     Socioeconomic History     Marital status: Single     Spouse name: Not on file     Number of children: Not on file     Years of education: Not on file     Highest education level: Not on file   Occupational History     Not on file   Social Needs     Financial resource strain: Not on file     Food insecurity:     Worry: Not on file     Inability: Not on file     Transportation needs:     Medical: Not on file     Non-medical: Not on file   Tobacco Use     Smoking status: Current Every Day Smoker     Types: Cigarettes     Smokeless tobacco: Never Used     Tobacco comment: 1 pack every 3 days with history of a pack every other day since the 80s   Substance and Sexual Activity     Alcohol use: Yes     Comment: occ     Drug use: Yes     Types: Marijuana     Comment: daily for appetite     Sexual activity: Not on file   Lifestyle     Physical activity:     Days per week: Not on file     Minutes per session: Not on file     Stress: Not on file   Relationships     Social connections:     Talks on phone: Not on file     Gets together: Not on file     Attends [employer] service: Not on file     Active member of club or organization: Not  on file     Attends meetings of clubs or organizations: Not on file     Relationship status: Not on file     Intimate partner violence:     Fear of current or ex partner: Not on file     Emotionally abused: Not on file     Physically abused: Not on file     Forced sexual activity: Not on file   Other Topics Concern     Not on file   Social History Narrative     Not on file       Family History  Family History   Problem Relation Age of Onset     Cataracts Mother      Diabetes Mother      Diabetes Father      Hypertension Father        Review of SystemsROS/MED HX  The complete review of systems is negative other than noted in the HPI or here.   ENT/Pulmonary:     (+)CHERIE risk factors hypertension, tobacco use, , . .    Neurologic:  - neg neurologic ROS     Cardiovascular: Comment: Type 1 thoracic aortic aneurysm dissection repair with valve sparing procedure, and then in 2017 he had a distal dissection.     (+) Dyslipidemia, hypertension-Peripheral Vascular Disease-- Other, --. Taking blood thinners Pt has received instructions: Instructions Given to patient: Planned 7 day hold for surgery. . . :. . Previous cardiac testing Echodate:2017results:Stress Testdate:2016 results:ECG reviewed date:11/15/18 results:SUPRAVENTRICULAR BRADYCARDIAsimilar to 9/13 ECG  VOLTAGE CRITERIA FOR LVH (MEETS CRITERIA IN ONE OF: R(aVL), S(V1), R(V5), R(V5/V6)+S(V1))similar to 9/13 ECG  NONSPECIFIC T-WAVE ABNORMALITYsimilar to 9/13 ECG   date: results:          METS/Exercise Tolerance:  3 - Able to walk 1-2 blocks without stopping   Hematologic:     (+) History of Transfusion no previous transfusion reaction -      Musculoskeletal:   (+) arthritis, , , -       GI/Hepatic:     (+) liver disease,       Renal/Genitourinary:     (+) chronic renal disease, type: CRI, Pt does not require dialysis, Pt has no history of transplant,       Endo:     (+) type II DM Last HgA1c: 4.8 date: 10/29/18 Not using insulin - not using insulin pump .     "  Psychiatric:     (+) psychiatric history depression      Infectious Disease:  - neg infectious disease ROS       Malignancy:   (+) Malignancy History of Prostate and Other  Prostate CA Remission status post Surgery, Other CA papillary adeno s/p right nephrectomy, now left renal mass Active status post Surgery         Other:    (+) No chance of pregnancy C-spine cleared: N/A, no H/O Chronic Pain,no other significant disability            PHYSICAL EXAM:   Mental Status/Neuro: A/A/O; Age Appropriate   Airway: Facies: Feasible  Mallampati: II  Mouth/Opening: Full  TM distance: > 6 cm  Neck ROM: Full   Respiratory: Auscultation: CTAB     Resp. Rate: Normal     Resp. Effort: Normal      CV: Rhythm: Regular  Heart: Normal Sounds   Comments:      Preop Vitals  BP Readings from Last 3 Encounters:   02/18/19 131/76    Pulse Readings from Last 3 Encounters:   02/18/19 54      Resp Readings from Last 3 Encounters:   No data found for Resp    SpO2 Readings from Last 3 Encounters:   No data found for SpO2      Temp Readings from Last 1 Encounters:   No data found for Temp    Ht Readings from Last 1 Encounters:   02/18/19 1.702 m (5' 7\")      Wt Readings from Last 1 Encounters:   02/18/19 67.9 kg (149 lb 11.2 oz)    Estimated body mass index is 23.45 kg/m  as calculated from the following:    Height as of 2/18/19: 1.702 m (5' 7\").    Weight as of 2/18/19: 67.9 kg (149 lb 11.2 oz).       Temp: 98.1  F (36.7  C) Temp src: Oral BP: 150/85 Pulse: 54   Resp: 18 SpO2: 100 %         154 lbs 3.2 oz  5' 7\"   Body mass index is 24.15 kg/m .       Physical Exam  Constitutional: Awake, alert, cooperative, no apparent distress, and appears stated age.  Eyes: Pupils equal, round and reactive to light, extra ocular muscles intact, sclera clear, conjunctiva normal.  HENT: Normocephalic, oral pharynx with moist mucus membranes, good dentition. No goiter appreciated.   Respiratory: Clear to auscultation bilaterally, no crackles or wheezing. " Mildly diminished lung fields. No cough or obvious dyspnea.  Cardiovascular: Regular rate and rhythm, normal S1 and S2, and no murmur noted.  Carotids +2, no bruits. No edema. Palpable pulses to radial  DP and PT arteries.   GI: Normal bowel sounds, soft, non-distended, non-tender, no masses palpated, no hepatosplenomegaly.  Surgical scars: multiple, well healed.   Lymph/Hematologic: No cervical lymphadenopathy and no supraclavicular lymphadenopathy.  Genitourinary: Deferred  Skin: Warm and dry.  No rashes at anticipated surgical site.   Musculoskeletal: Full ROM of neck. There is no redness, warmth, or swelling of the joints. Gross motor strength is normal.    Neurologic: Awake, alert, oriented to name, place and time. Cranial nerves II-XII are grossly intact. Gait is normal.   Neuropsychiatric: Calm, cooperative. Normal affect.     Labs: (personally reviewed)  Lab Results   Component Value Date    WBC 5.0 03/08/2019     Lab Results   Component Value Date    RBC 3.97 03/08/2019     Lab Results   Component Value Date    HGB 12.2 03/08/2019     Lab Results   Component Value Date    HCT 36.8 03/08/2019     Lab Results   Component Value Date    MCV 93 03/08/2019     Lab Results   Component Value Date    MCH 30.7 03/08/2019     Lab Results   Component Value Date    MCHC 33.2 03/08/2019     Lab Results   Component Value Date    RDW 16.7 03/08/2019     Lab Results   Component Value Date     03/08/2019     Last Comprehensive Metabolic Panel:  Sodium   Date Value Ref Range Status   03/08/2019 138 133 - 144 mmol/L Final     Potassium   Date Value Ref Range Status   03/08/2019 4.2 3.4 - 5.3 mmol/L Final     Chloride   Date Value Ref Range Status   03/08/2019 105 94 - 109 mmol/L Final     Carbon Dioxide   Date Value Ref Range Status   03/08/2019 28 20 - 32 mmol/L Final     Anion Gap   Date Value Ref Range Status   03/08/2019 5 3 - 14 mmol/L Final     Glucose   Date Value Ref Range Status   03/08/2019 104 (H) 70 - 99  mg/dL Final     Urea Nitrogen   Date Value Ref Range Status   03/08/2019 18 7 - 30 mg/dL Final     Creatinine   Date Value Ref Range Status   03/08/2019 1.04 0.66 - 1.25 mg/dL Final     GFR Estimate   Date Value Ref Range Status   03/08/2019 80 >60 mL/min/[1.73_m2] Final     Comment:     Non  GFR Calc  Starting 12/18/2018, serum creatinine based estimated GFR (eGFR) will be   calculated using the Chronic Kidney Disease Epidemiology Collaboration   (CKD-EPI) equation.       Calcium   Date Value Ref Range Status   03/08/2019 10.2 (H) 8.5 - 10.1 mg/dL Final     EKG: Personally reviewed 11/15/18   SUPRAVENTRICULAR BRADYCARDIAsimilar to 9/13 ECG  VOLTAGE CRITERIA FOR LVH [MEETS CRITERIA IN ONE OF: R(aVL), S(V1), R(V5), R(V5/V6)+S(V1)]similar to 9/13 ECG  NONSPECIFIC T-WAVE ABNORMALITYsimilar to 9/13 ECG    Cardiac echo: 2017  Final Impressions:  Limited Echocardiogram performed   1. Technically limited exam.   2. Echo contrast was administrered to enhance visualization of all left ventricular segments.   3. Normal LV size, borderline wall thickness, normal global systolic function with an estimated EF of 55 - 60%. Abnormal septal motion likely due to conduction.   4. Right ventricular cavity size is normal, global systolic RV function is normal.    8/23/18 CT aortic chest/abd/pelvis angio  Impression:   1. Increasing size of an enhancing lesion in the anterior cortex of the left kidney, concerning for renal cell carcinoma.  2. No significant change in the appearance of the aorta, status post descending thoracic aortic aneurysm repair. Persistent chronic abdominal aortic dissection arising at the level of the diaphragm and extending into the left external iliac artery.  3. Stable postoperative changes from right nephrectomy and prostatectomy without evidence of significant localized recurrence.    Stress test:   Stress echo 2016  CONCLUSIONS    SUMMARY    Age-predicted target heart rate was not achieved  with stress, which  reduces the sensitivity of this test for the detection of inducible  ischemia.    The patient exercised for 7:26 minutes on the standard Tony Protocol and  achieved a peak heart rate of 115 bpm representing 69 % of age predicted  maximum heart rate and an estimated work load of 9.2 METs. Test was  terminated because of fatigue. Patient did not report chest pain.    Normal stress echocardiogram with a reduced degree of certainty.    1. No ischemia at the cardiac workload achieved.  2. Left ventricular function normal at rest, improved with stress.  3. No ischemic ECG response with adequate heart rate.  4. The patient did not report angina with stress.  5. Exercise capacity was fair .  6. The baseline echocardiogram revealed no significant valvular  abnormalities.  7. The PA systolic pressure was 29 mm Hg + RA at rest.  11/15/18 CXR  Findings: Heart size is at the upper limits of normal. The pulmonary vascularity is within normal limits. Postsurgical changes involving the left chest with fibroatelectasis in the left lung base. Negative for pleural effusion. Chronic posterior left fifth and sixth rib fractures. No acute fracture.    IMPRESSION  Impression: No acute findings.    Imaging and cardiac testing reviewed by this provider      Outside records reviewed from: Care Everywhere    ASSESSMENT and PLAN  Paul Ramirez is a 58 year old male scheduled to undergo Left Robotic Partial Nephrectomy with Dr. Velasquez on 3/22/19. He has the following specific operative considerations:   - RCRI : 0.9% risk of major adverse cardiac event.   - Anesthesia considerations:  Refer to PAC assessment in anesthesia records  - VTE risk: 3%  - CHERIE # of risks 3/8 = Intermediate risk  - Risk of PONV score = 2.  If > 2, anti-emetic intervention recommended.    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 Above procedure planned.   -HLD.  Simvastatin. HTN Will take amlodipine, carvedilol, and labetalol on DOS. History of type 1 thoracic aortic aneurysm dissection repair with valve sparing procedure 2007, and then in 2017 he had a distal dissection repair as above. Denies chest or back pain or palpitations. All testing above. Able to walk distance. Planned 7 day hold for aspirin.   -Continued smoking 1 PPD every 3 days. Denies pulmonary symptoms.   -CKD Cr normal today.   -Depression. Will take citalopram on DOS.  -Past history of blood transfusion. Type and screen drawn today.     Arrival time, NPO, shower and medication instructions provided by nursing staff today. Preparing For Your Surgery handout given.    Patient was discussed with Dr Chou.    SOHAM Flynn CNS  Preoperative Assessment Center  [facility]  [facility] and [facility] Center  Phone: [phone]  Fax: [phone]

## 2019-03-08 NOTE — ADDENDUM NOTE
Addendum  created 03/08/19 1222 by Carlotta Zavala RN    Patient Education assessment filed, Patient Education documented on, Patient Education title added

## 2019-03-22 ENCOUNTER — HOSPITAL ENCOUNTER (OUTPATIENT)
Facility: CLINIC | Age: 59
LOS: 1 days | Discharge: HOME | End: 2019-03-23
Attending: UROLOGY | Admitting: UROLOGY
Payer: COMMERCIAL

## 2019-03-22 ENCOUNTER — ANESTHESIA (OUTPATIENT)
Dept: SURGERY | Facility: CLINIC | Age: 59
End: 2019-03-22
Payer: COMMERCIAL

## 2019-03-22 DIAGNOSIS — N28.89 RENAL MASS: Primary | ICD-10-CM

## 2019-03-22 LAB
ABO + RH BLD: NORMAL
ABO + RH BLD: NORMAL
ANION GAP SERPL CALCULATED.3IONS-SCNC: 12 MMOL/L (ref 3–14)
BASE DEFICIT BLDA-SCNC: 3.4 MMOL/L
BLD GP AB SCN SERPL QL: NORMAL
BLOOD BANK CMNT PATIENT-IMP: NORMAL
BLOOD BANK CMNT PATIENT-IMP: NORMAL
BUN SERPL-MCNC: 28 MG/DL (ref 7–30)
CA-I BLD-MCNC: 5.3 MG/DL (ref 4.4–5.2)
CALCIUM SERPL-MCNC: 8.9 MG/DL (ref 8.5–10.1)
CHLORIDE SERPL-SCNC: 104 MMOL/L (ref 94–109)
CO2 SERPL-SCNC: 21 MMOL/L (ref 20–32)
CREAT SERPL-MCNC: 1.33 MG/DL (ref 0.66–1.25)
ERYTHROCYTE [DISTWIDTH] IN BLOOD BY AUTOMATED COUNT: 15.9 % (ref 10–15)
GFR SERPL CREATININE-BSD FRML MDRD: 59 ML/MIN/{1.73_M2}
GLUCOSE BLD-MCNC: 107 MG/DL (ref 70–99)
GLUCOSE BLDC GLUCOMTR-MCNC: 122 MG/DL (ref 70–99)
GLUCOSE BLDC GLUCOMTR-MCNC: 151 MG/DL (ref 70–99)
GLUCOSE BLDC GLUCOMTR-MCNC: 164 MG/DL (ref 70–99)
GLUCOSE SERPL-MCNC: 176 MG/DL (ref 70–99)
HCO3 BLD-SCNC: 23 MMOL/L (ref 21–28)
HCT VFR BLD AUTO: 37.1 % (ref 40–53)
HGB BLD-MCNC: 11.6 G/DL (ref 13.3–17.7)
HGB BLD-MCNC: 12.5 G/DL (ref 13.3–17.7)
MCH RBC QN AUTO: 32.1 PG (ref 26.5–33)
MCHC RBC AUTO-ENTMCNC: 33.7 G/DL (ref 31.5–36.5)
MCV RBC AUTO: 95 FL (ref 78–100)
O2/TOTAL GAS SETTING VFR VENT: 46 %
PCO2 BLD: 44 MM HG (ref 35–45)
PH BLD: 7.32 PH (ref 7.35–7.45)
PLATELET # BLD AUTO: 167 10E9/L (ref 150–450)
PO2 BLD: 136 MM HG (ref 80–105)
POTASSIUM BLD-SCNC: 3.9 MMOL/L (ref 3.4–5.3)
POTASSIUM SERPL-SCNC: 3.6 MMOL/L (ref 3.4–5.3)
POTASSIUM SERPL-SCNC: 4.1 MMOL/L (ref 3.4–5.3)
RBC # BLD AUTO: 3.9 10E12/L (ref 4.4–5.9)
SODIUM BLD-SCNC: 137 MMOL/L (ref 133–144)
SODIUM SERPL-SCNC: 137 MMOL/L (ref 133–144)
SPECIMEN EXP DATE BLD: NORMAL
WBC # BLD AUTO: 8.3 10E9/L (ref 4–11)

## 2019-03-22 PROCEDURE — 37000009 ZZH ANESTHESIA TECHNICAL FEE, EACH ADDTL 15 MIN: Performed by: UROLOGY

## 2019-03-22 PROCEDURE — 36000088 ZZH SURGERY LEVEL 8 EA 15 ADDTL MIN - UMMC: Performed by: UROLOGY

## 2019-03-22 PROCEDURE — 88307 TISSUE EXAM BY PATHOLOGIST: CPT | Performed by: UROLOGY

## 2019-03-22 PROCEDURE — 80048 BASIC METABOLIC PNL TOTAL CA: CPT | Performed by: UROLOGY

## 2019-03-22 PROCEDURE — 84132 ASSAY OF SERUM POTASSIUM: CPT | Performed by: UROLOGY

## 2019-03-22 PROCEDURE — 40000275 ZZH STATISTIC RCP TIME EA 10 MIN

## 2019-03-22 PROCEDURE — 25000132 ZZH RX MED GY IP 250 OP 250 PS 637: Performed by: UROLOGY

## 2019-03-22 PROCEDURE — 82962 GLUCOSE BLOOD TEST: CPT

## 2019-03-22 PROCEDURE — 25000128 H RX IP 250 OP 636: Performed by: UROLOGY

## 2019-03-22 PROCEDURE — 71000014 ZZH RECOVERY PHASE 1 LEVEL 2 FIRST HR: Performed by: UROLOGY

## 2019-03-22 PROCEDURE — 84295 ASSAY OF SERUM SODIUM: CPT | Performed by: ANESTHESIOLOGY

## 2019-03-22 PROCEDURE — 25000566 ZZH SEVOFLURANE, EA 15 MIN: Performed by: UROLOGY

## 2019-03-22 PROCEDURE — 82803 BLOOD GASES ANY COMBINATION: CPT | Performed by: ANESTHESIOLOGY

## 2019-03-22 PROCEDURE — 82947 ASSAY GLUCOSE BLOOD QUANT: CPT | Performed by: ANESTHESIOLOGY

## 2019-03-22 PROCEDURE — 40000014 ZZH STATISTIC ARTERIAL MONITORING DAILY

## 2019-03-22 PROCEDURE — 25000128 H RX IP 250 OP 636: Performed by: ANESTHESIOLOGY

## 2019-03-22 PROCEDURE — 71000015 ZZH RECOVERY PHASE 1 LEVEL 2 EA ADDTL HR: Performed by: UROLOGY

## 2019-03-22 PROCEDURE — 40000170 ZZH STATISTIC PRE-PROCEDURE ASSESSMENT II: Performed by: UROLOGY

## 2019-03-22 PROCEDURE — 88305 TISSUE EXAM BY PATHOLOGIST: CPT | Performed by: UROLOGY

## 2019-03-22 PROCEDURE — 25000132 ZZH RX MED GY IP 250 OP 250 PS 637: Performed by: STUDENT IN AN ORGANIZED HEALTH CARE EDUCATION/TRAINING PROGRAM

## 2019-03-22 PROCEDURE — 36415 COLL VENOUS BLD VENIPUNCTURE: CPT | Performed by: UROLOGY

## 2019-03-22 PROCEDURE — 25000132 ZZH RX MED GY IP 250 OP 250 PS 637: Performed by: ANESTHESIOLOGY

## 2019-03-22 PROCEDURE — 84132 ASSAY OF SERUM POTASSIUM: CPT | Performed by: ANESTHESIOLOGY

## 2019-03-22 PROCEDURE — 25800030 ZZH RX IP 258 OP 636: Performed by: UROLOGY

## 2019-03-22 PROCEDURE — 37000008 ZZH ANESTHESIA TECHNICAL FEE, 1ST 30 MIN: Performed by: UROLOGY

## 2019-03-22 PROCEDURE — 27210794 ZZH OR GENERAL SUPPLY STERILE: Performed by: UROLOGY

## 2019-03-22 PROCEDURE — 85027 COMPLETE CBC AUTOMATED: CPT | Performed by: UROLOGY

## 2019-03-22 PROCEDURE — 82330 ASSAY OF CALCIUM: CPT | Performed by: ANESTHESIOLOGY

## 2019-03-22 PROCEDURE — 88331 PATH CONSLTJ SURG 1 BLK 1SPC: CPT | Performed by: UROLOGY

## 2019-03-22 PROCEDURE — 25000125 ZZHC RX 250: Performed by: NURSE ANESTHETIST, CERTIFIED REGISTERED

## 2019-03-22 PROCEDURE — 25000128 H RX IP 250 OP 636: Performed by: NURSE ANESTHETIST, CERTIFIED REGISTERED

## 2019-03-22 PROCEDURE — 25000128 H RX IP 250 OP 636: Performed by: STUDENT IN AN ORGANIZED HEALTH CARE EDUCATION/TRAINING PROGRAM

## 2019-03-22 PROCEDURE — 36000086 ZZH SURGERY LEVEL 8 1ST 30 MIN UMMC: Performed by: UROLOGY

## 2019-03-22 RX ORDER — ONDANSETRON 2 MG/ML
4 INJECTION INTRAMUSCULAR; INTRAVENOUS EVERY 30 MIN PRN
Status: DISCONTINUED | OUTPATIENT
Start: 2019-03-22 | End: 2019-03-22 | Stop reason: HOSPADM

## 2019-03-22 RX ORDER — BUPIVACAINE HYDROCHLORIDE 2.5 MG/ML
INJECTION, SOLUTION INFILTRATION; PERINEURAL PRN
Status: DISCONTINUED | OUTPATIENT
Start: 2019-03-22 | End: 2019-03-22 | Stop reason: HOSPADM

## 2019-03-22 RX ORDER — CEFAZOLIN SODIUM 2 G/100ML
2 INJECTION, SOLUTION INTRAVENOUS
Status: DISCONTINUED | OUTPATIENT
Start: 2019-03-22 | End: 2019-03-22 | Stop reason: HOSPADM

## 2019-03-22 RX ORDER — FENTANYL CITRATE 50 UG/ML
INJECTION, SOLUTION INTRAMUSCULAR; INTRAVENOUS PRN
Status: DISCONTINUED | OUTPATIENT
Start: 2019-03-22 | End: 2019-03-22

## 2019-03-22 RX ORDER — OXYCODONE HYDROCHLORIDE 5 MG/1
5-10 TABLET ORAL
Status: DISCONTINUED | OUTPATIENT
Start: 2019-03-22 | End: 2019-03-23 | Stop reason: HOSPADM

## 2019-03-22 RX ORDER — PROPOFOL 10 MG/ML
INJECTION, EMULSION INTRAVENOUS PRN
Status: DISCONTINUED | OUTPATIENT
Start: 2019-03-22 | End: 2019-03-22

## 2019-03-22 RX ORDER — HYDRALAZINE HYDROCHLORIDE 20 MG/ML
2.5-5 INJECTION INTRAMUSCULAR; INTRAVENOUS EVERY 10 MIN PRN
Status: DISCONTINUED | OUTPATIENT
Start: 2019-03-22 | End: 2019-03-22 | Stop reason: HOSPADM

## 2019-03-22 RX ORDER — CALCIUM CARBONATE 500 MG/1
1000 TABLET, CHEWABLE ORAL 4 TIMES DAILY PRN
Status: DISCONTINUED | OUTPATIENT
Start: 2019-03-22 | End: 2019-03-23 | Stop reason: HOSPADM

## 2019-03-22 RX ORDER — SIMVASTATIN 5 MG
10 TABLET ORAL EVERY MORNING
Status: DISCONTINUED | OUTPATIENT
Start: 2019-03-23 | End: 2019-03-23 | Stop reason: HOSPADM

## 2019-03-22 RX ORDER — NALOXONE HYDROCHLORIDE 0.4 MG/ML
.1-.4 INJECTION, SOLUTION INTRAMUSCULAR; INTRAVENOUS; SUBCUTANEOUS
Status: DISCONTINUED | OUTPATIENT
Start: 2019-03-22 | End: 2019-03-22 | Stop reason: HOSPADM

## 2019-03-22 RX ORDER — ACETAMINOPHEN 325 MG/1
975 TABLET ORAL EVERY 6 HOURS
Status: DISCONTINUED | OUTPATIENT
Start: 2019-03-22 | End: 2019-03-23 | Stop reason: HOSPADM

## 2019-03-22 RX ORDER — HYDROMORPHONE HYDROCHLORIDE 1 MG/ML
.3-.5 INJECTION, SOLUTION INTRAMUSCULAR; INTRAVENOUS; SUBCUTANEOUS EVERY 10 MIN PRN
Status: DISCONTINUED | OUTPATIENT
Start: 2019-03-22 | End: 2019-03-22 | Stop reason: HOSPADM

## 2019-03-22 RX ORDER — LABETALOL 100 MG/1
100 TABLET, FILM COATED ORAL EVERY MORNING
Status: DISCONTINUED | OUTPATIENT
Start: 2019-03-23 | End: 2019-03-22

## 2019-03-22 RX ORDER — SODIUM CHLORIDE 9 MG/ML
INJECTION, SOLUTION INTRAVENOUS CONTINUOUS
Status: DISCONTINUED | OUTPATIENT
Start: 2019-03-22 | End: 2019-03-23 | Stop reason: HOSPADM

## 2019-03-22 RX ORDER — HYDROMORPHONE HCL/0.9% NACL/PF 0.2MG/0.2
0.2 SYRINGE (ML) INTRAVENOUS
Status: DISCONTINUED | OUTPATIENT
Start: 2019-03-22 | End: 2019-03-23 | Stop reason: HOSPADM

## 2019-03-22 RX ORDER — FENTANYL CITRATE 50 UG/ML
25-50 INJECTION, SOLUTION INTRAMUSCULAR; INTRAVENOUS
Status: DISCONTINUED | OUTPATIENT
Start: 2019-03-22 | End: 2019-03-22 | Stop reason: HOSPADM

## 2019-03-22 RX ORDER — ONDANSETRON 2 MG/ML
4 INJECTION INTRAMUSCULAR; INTRAVENOUS EVERY 6 HOURS PRN
Status: DISCONTINUED | OUTPATIENT
Start: 2019-03-22 | End: 2019-03-23 | Stop reason: HOSPADM

## 2019-03-22 RX ORDER — LIDOCAINE 40 MG/G
CREAM TOPICAL
Status: DISCONTINUED | OUTPATIENT
Start: 2019-03-22 | End: 2019-03-22 | Stop reason: HOSPADM

## 2019-03-22 RX ORDER — AMLODIPINE BESYLATE 10 MG/1
10 TABLET ORAL EVERY MORNING
Status: DISCONTINUED | OUTPATIENT
Start: 2019-03-23 | End: 2019-03-23 | Stop reason: HOSPADM

## 2019-03-22 RX ORDER — NALOXONE HYDROCHLORIDE 0.4 MG/ML
.1-.4 INJECTION, SOLUTION INTRAMUSCULAR; INTRAVENOUS; SUBCUTANEOUS
Status: DISCONTINUED | OUTPATIENT
Start: 2019-03-22 | End: 2019-03-23 | Stop reason: HOSPADM

## 2019-03-22 RX ORDER — GRANISETRON HYDROCHLORIDE 1 MG/1
1 TABLET, FILM COATED ORAL
Status: COMPLETED | OUTPATIENT
Start: 2019-03-22 | End: 2019-03-22

## 2019-03-22 RX ORDER — LIDOCAINE 40 MG/G
CREAM TOPICAL
Status: DISCONTINUED | OUTPATIENT
Start: 2019-03-22 | End: 2019-03-23 | Stop reason: HOSPADM

## 2019-03-22 RX ORDER — ALBUTEROL SULFATE 0.83 MG/ML
2.5 SOLUTION RESPIRATORY (INHALATION) EVERY 4 HOURS PRN
Status: DISCONTINUED | OUTPATIENT
Start: 2019-03-22 | End: 2019-03-22 | Stop reason: HOSPADM

## 2019-03-22 RX ORDER — MEPERIDINE HYDROCHLORIDE 50 MG/ML
12.5 INJECTION INTRAMUSCULAR; INTRAVENOUS; SUBCUTANEOUS
Status: DISCONTINUED | OUTPATIENT
Start: 2019-03-22 | End: 2019-03-22 | Stop reason: HOSPADM

## 2019-03-22 RX ORDER — CITALOPRAM HYDROBROMIDE 20 MG/1
20 TABLET ORAL EVERY MORNING
Status: DISCONTINUED | OUTPATIENT
Start: 2019-03-23 | End: 2019-03-23 | Stop reason: HOSPADM

## 2019-03-22 RX ORDER — PROCHLORPERAZINE MALEATE 5 MG
10 TABLET ORAL EVERY 6 HOURS PRN
Status: DISCONTINUED | OUTPATIENT
Start: 2019-03-22 | End: 2019-03-23 | Stop reason: HOSPADM

## 2019-03-22 RX ORDER — HYDRALAZINE HYDROCHLORIDE 20 MG/ML
INJECTION INTRAMUSCULAR; INTRAVENOUS PRN
Status: DISCONTINUED | OUTPATIENT
Start: 2019-03-22 | End: 2019-03-22

## 2019-03-22 RX ORDER — CEFAZOLIN SODIUM 2 G/100ML
2 INJECTION, SOLUTION INTRAVENOUS
Status: COMPLETED | OUTPATIENT
Start: 2019-03-22 | End: 2019-03-22

## 2019-03-22 RX ORDER — DIPHENHYDRAMINE HYDROCHLORIDE 50 MG/ML
25 INJECTION INTRAMUSCULAR; INTRAVENOUS
Status: COMPLETED | OUTPATIENT
Start: 2019-03-22 | End: 2019-03-22

## 2019-03-22 RX ORDER — FENTANYL CITRATE 50 UG/ML
25-50 INJECTION, SOLUTION INTRAMUSCULAR; INTRAVENOUS
Status: DISCONTINUED | OUTPATIENT
Start: 2019-03-22 | End: 2019-03-22

## 2019-03-22 RX ORDER — SODIUM CHLORIDE, SODIUM LACTATE, POTASSIUM CHLORIDE, CALCIUM CHLORIDE 600; 310; 30; 20 MG/100ML; MG/100ML; MG/100ML; MG/100ML
INJECTION, SOLUTION INTRAVENOUS CONTINUOUS
Status: DISCONTINUED | OUTPATIENT
Start: 2019-03-22 | End: 2019-03-22 | Stop reason: HOSPADM

## 2019-03-22 RX ORDER — CEFAZOLIN SODIUM 1 G/3ML
1 INJECTION, POWDER, FOR SOLUTION INTRAMUSCULAR; INTRAVENOUS SEE ADMIN INSTRUCTIONS
Status: DISCONTINUED | OUTPATIENT
Start: 2019-03-22 | End: 2019-03-22 | Stop reason: HOSPADM

## 2019-03-22 RX ORDER — CARVEDILOL 12.5 MG/1
12.5 TABLET ORAL 2 TIMES DAILY WITH MEALS
Status: DISCONTINUED | OUTPATIENT
Start: 2019-03-22 | End: 2019-03-23 | Stop reason: HOSPADM

## 2019-03-22 RX ORDER — ONDANSETRON 4 MG/1
4 TABLET, ORALLY DISINTEGRATING ORAL EVERY 30 MIN PRN
Status: DISCONTINUED | OUTPATIENT
Start: 2019-03-22 | End: 2019-03-22 | Stop reason: HOSPADM

## 2019-03-22 RX ORDER — LIDOCAINE HYDROCHLORIDE 20 MG/ML
INJECTION, SOLUTION INFILTRATION; PERINEURAL PRN
Status: DISCONTINUED | OUTPATIENT
Start: 2019-03-22 | End: 2019-03-22

## 2019-03-22 RX ORDER — METOPROLOL TARTRATE 1 MG/ML
1-2 INJECTION, SOLUTION INTRAVENOUS EVERY 5 MIN PRN
Status: DISCONTINUED | OUTPATIENT
Start: 2019-03-22 | End: 2019-03-22 | Stop reason: HOSPADM

## 2019-03-22 RX ORDER — TRAMADOL HYDROCHLORIDE 50 MG/1
50 TABLET ORAL
Status: COMPLETED | OUTPATIENT
Start: 2019-03-22 | End: 2019-03-22

## 2019-03-22 RX ORDER — ONDANSETRON 4 MG/1
4 TABLET, ORALLY DISINTEGRATING ORAL EVERY 6 HOURS PRN
Status: DISCONTINUED | OUTPATIENT
Start: 2019-03-22 | End: 2019-03-23 | Stop reason: HOSPADM

## 2019-03-22 RX ORDER — DIMENHYDRINATE 50 MG/ML
25 INJECTION, SOLUTION INTRAMUSCULAR; INTRAVENOUS
Status: COMPLETED | OUTPATIENT
Start: 2019-03-22 | End: 2019-03-22

## 2019-03-22 RX ORDER — EPHEDRINE SULFATE 50 MG/ML
INJECTION, SOLUTION INTRAMUSCULAR; INTRAVENOUS; SUBCUTANEOUS PRN
Status: DISCONTINUED | OUTPATIENT
Start: 2019-03-22 | End: 2019-03-22

## 2019-03-22 RX ADMIN — SODIUM CHLORIDE: 9 INJECTION, SOLUTION INTRAVENOUS at 12:55

## 2019-03-22 RX ADMIN — ROCURONIUM BROMIDE 20 MG: 10 INJECTION INTRAVENOUS at 08:35

## 2019-03-22 RX ADMIN — ROCURONIUM BROMIDE 20 MG: 10 INJECTION INTRAVENOUS at 07:57

## 2019-03-22 RX ADMIN — HYDRALAZINE HYDROCHLORIDE 3 MG: 20 INJECTION INTRAMUSCULAR; INTRAVENOUS at 09:11

## 2019-03-22 RX ADMIN — OXYCODONE HYDROCHLORIDE 5 MG: 5 TABLET ORAL at 19:19

## 2019-03-22 RX ADMIN — CEFAZOLIN 1 G: 1 INJECTION, POWDER, FOR SOLUTION INTRAMUSCULAR; INTRAVENOUS at 11:58

## 2019-03-22 RX ADMIN — LIDOCAINE HYDROCHLORIDE 100 MG: 20 INJECTION, SOLUTION INFILTRATION; PERINEURAL at 07:31

## 2019-03-22 RX ADMIN — SUGAMMADEX 200 MG: 100 INJECTION, SOLUTION INTRAVENOUS at 11:50

## 2019-03-22 RX ADMIN — ROCURONIUM BROMIDE 50 MG: 10 INJECTION INTRAVENOUS at 07:31

## 2019-03-22 RX ADMIN — Medication 7.5 MG: at 07:40

## 2019-03-22 RX ADMIN — CEFAZOLIN SODIUM 2 G: 2 INJECTION, SOLUTION INTRAVENOUS at 08:04

## 2019-03-22 RX ADMIN — ACETAMINOPHEN 975 MG: 325 TABLET, FILM COATED ORAL at 19:19

## 2019-03-22 RX ADMIN — MIDAZOLAM 2 MG: 1 INJECTION INTRAMUSCULAR; INTRAVENOUS at 07:23

## 2019-03-22 RX ADMIN — FENTANYL CITRATE 25 MCG: 50 INJECTION INTRAMUSCULAR; INTRAVENOUS at 13:06

## 2019-03-22 RX ADMIN — FENTANYL CITRATE 100 MCG: 50 INJECTION, SOLUTION INTRAMUSCULAR; INTRAVENOUS at 07:31

## 2019-03-22 RX ADMIN — SODIUM CHLORIDE, POTASSIUM CHLORIDE, SODIUM LACTATE AND CALCIUM CHLORIDE: 600; 310; 30; 20 INJECTION, SOLUTION INTRAVENOUS at 07:59

## 2019-03-22 RX ADMIN — GABAPENTIN 400 MG: 300 CAPSULE ORAL at 07:09

## 2019-03-22 RX ADMIN — ROCURONIUM BROMIDE 10 MG: 10 INJECTION INTRAVENOUS at 09:24

## 2019-03-22 RX ADMIN — SODIUM CHLORIDE, POTASSIUM CHLORIDE, SODIUM LACTATE AND CALCIUM CHLORIDE: 600; 310; 30; 20 INJECTION, SOLUTION INTRAVENOUS at 07:23

## 2019-03-22 RX ADMIN — CEFAZOLIN 1 G: 1 INJECTION, POWDER, FOR SOLUTION INTRAMUSCULAR; INTRAVENOUS at 10:00

## 2019-03-22 RX ADMIN — FENTANYL CITRATE 50 MCG: 50 INJECTION, SOLUTION INTRAMUSCULAR; INTRAVENOUS at 10:25

## 2019-03-22 RX ADMIN — FENTANYL CITRATE 100 MCG: 50 INJECTION, SOLUTION INTRAMUSCULAR; INTRAVENOUS at 07:55

## 2019-03-22 RX ADMIN — DIPHENHYDRAMINE HYDROCHLORIDE 25 MG: 50 INJECTION INTRAMUSCULAR; INTRAVENOUS at 14:21

## 2019-03-22 RX ADMIN — SODIUM CHLORIDE 500 ML: 9 INJECTION, SOLUTION INTRAVENOUS at 18:33

## 2019-03-22 RX ADMIN — DIPHENHYDRAMINE HYDROCHLORIDE 25 MG: 50 INJECTION INTRAMUSCULAR; INTRAVENOUS at 14:04

## 2019-03-22 RX ADMIN — PROPOFOL 170 MG: 10 INJECTION, EMULSION INTRAVENOUS at 07:31

## 2019-03-22 RX ADMIN — DIMENHYDRINATE 25 MG: 50 INJECTION, SOLUTION INTRAMUSCULAR; INTRAVENOUS at 13:01

## 2019-03-22 RX ADMIN — GRANISETRON HYDROCHLORIDE 1 MG: 1 TABLET, FILM COATED ORAL at 07:09

## 2019-03-22 RX ADMIN — CARVEDILOL 12.5 MG: 12.5 TABLET, FILM COATED ORAL at 19:19

## 2019-03-22 RX ADMIN — TRAMADOL HYDROCHLORIDE 50 MG: 50 TABLET, COATED ORAL at 07:09

## 2019-03-22 ASSESSMENT — MIFFLIN-ST. JEOR: SCORE: 1457.75

## 2019-03-22 NOTE — OR NURSING
B/P: 112/81, T: 97.5, P: 64, R: 16, Sa02 100% 2L NC    STAT pacu labs resulted, paged Dr. Guera Fisher from urology to review results.     Nursing report given to 7B, awaiting clean room.    Dr. Forde ([facility]) notified of patient status and will do sign out when able.       Addendum: patient complaining of generalized itching, generalized rash of raised circular areas noted at 1400, mostly to BUE, some to chest and BLE noted. Denies shortness of breath, no swelling noted. Discussed with Dr. Epstein ([facility]). Will give benedryl dose now. Only medications given in pacu were Fentanyl (had already received in OR) and dimenhydrinate (Dramamine) for nausea. Added Dimenhydrinate to allergy list for now. Also paged Urology resident on-call WILBERTO Fisher to update about rash.

## 2019-03-22 NOTE — ANESTHESIA CARE TRANSFER NOTE
Patient: Paul Ramirez    Procedure(s):  DaVinci Assisted Left Partial Nephrectomy    Diagnosis: History Of Renal Cell Cancer  Diagnosis Additional Information: No value filed.    Anesthesia Type:   No value filed.     Note:  Airway :Face Mask  Patient transferred to:PACU  Comments: Pt. Pink and breathing spontaneously.  Vitals stable.  Report given to oncoming nurse.  Transfer of care occurred.Handoff Report: Identifed the Patient, Identified the Reponsible Provider, Reviewed the pertinent medical history, Discussed the surgical course, Reviewed Intra-OP anesthesia mangement and issues during anesthesia, Set expectations for post-procedure period and Allowed opportunity for questions and acknowledgement of understanding      Vitals: (Last set prior to Anesthesia Care Transfer)    CRNA VITALS  3/22/2019 1132 - 3/22/2019 1214      3/22/2019             Resp Rate (observed):  2  (Abnormal)                 Electronically Signed By: SOHAM Antunez CRNA  March 22, 2019  12:14 PM

## 2019-03-22 NOTE — ANESTHESIA PROCEDURE NOTES
Arterial Line Procedure Note  Staff:     Anesthesiologist:  Magy Forde MD  Location: In OR After Induction  Procedure Start/Stop Times:     patient identified, IV checked, site marked, risks and benefits discussed, informed consent, monitors and equipment checked, pre-op evaluation and at physician/surgeon's request      Correct Patient: Yes      Correct Position: Yes      Correct Site: Yes      Correct Procedure: Yes      Correct Laterality:  Yes    Site Marked:  Yes  Line Placement:     Procedure:  Arterial Line    Insertion Site:  Radial    Insertion laterality:  Left    Skin Prep: Chloraprep      Patient Prep: patient draped, mask, sterile gloves, hat and hand hygiene      Local skin infiltration:  1% lidocaine    amount (mL):  0    Ultrasound Guided?: No      Catheter size:  20 gauge, Quick cath    Dressing:  Tegaderm    Complications:  None obvious    Arterial waveform: Yes      IBP within 10% of NIBP: Yes

## 2019-03-22 NOTE — OP NOTE
PREOPERATIVE DIAGNOSIS: Left renal mass    POSTOPERATIVE DIAGNOSIS: Left renal masses and renal cysts  PROCEDURES PERFORMED:   1. Left robot assisted partial nephrectomy  2. Left robot assisted renal cyst unroofing    STAFF SURGEON: Dr. Tyson Velasquez MD   ASSISTANT: Hair Dyer MD; Anup Mcgraw MD  ANESTHESIA: GET  ESTIMATED BLOOD LOSS: 77 mL.   IV FLUIDS: see dictated anesthesia record  COMPLICATIONS: None.   SPECIMEN:   1. Left renal mass #1 - Medial: Suspicious for solid mass seen on imaging  2. Left renal mass #2 - Lateral: Less suspicious but still solid on ultrasound and anterior  3. Left renal cyst unroofed - frozen negative + cyst contents  4. Multiple other simple cysts unroofed    DRAINS/TUBES: 16Fr mckinney  SIGNIFICANT FINDINGS: 13 min 10 sec clamp time    BRIEF OPERATIVE INDICATIONS: Paul Ramirez is a 58 year old male with a history of previous right open nephrectomy for papillary RCC as well as previous RRP for prostate cancer. Recently found to have a solid renal mass in the solitary left kidney. After discussion with patient about the risks, benefits, and alternatives of surgery he elected to proceed.     DESCRIPTION OF PROCEDURE:  Informed consent was obtained and the patient was brought to the operating room where general anesthesia was induced. he was given appropriate preoperative antibiotics within 1 hour of incision. he  was then positioned in the full flank position with Left side up where all  pressure points were carefully padded and secured. he was then prepped and draped in the usual sterile fashion. We then performed a timeout, verifying the correct patient's site and procedure to be performed.    We began by inserting the Veress needle into the abdomen at the left lower quadrant. After confirmatory drop test, the abdomen was insufflated with low opening pressures. We then proceeded to place three 8 mm non-dilating robotic ports to triangulate the  Left kidney.   The 12mm  assistant port was placed cephalad to the umbilicus between the robot ports. We docked the robot.  We began by first mobilizing the colon from that the lateral aspect of the abdominal wall and reflecting it medially. Gerota's fascia was then opened and the lower pole of the kidney mobilized. The gonadal vessels were identified and spared.We then traced these back to the hilum. We were able to identify 1 renal vein. The vein were carefully dissected and freed from surrounding strctures. We were also able to visualize 2 renal arteries as well cephalad and posterior to the vein which was also dissected free from surrounding fat.    Prior to performing the partial nephrectomy we performed an ultrasound that identified two solid masses in the anterior kidney - one more medial and close to the hilum suspicious for the mass seen on CT and one more lateral - less suspicious.  Numerous cysts were unroofed - including one far posterior-lateral that contained solid proteinacious structure. Frozen margin here was negative for malignancy.    We made room around both structures to accommodate the bulldog laparoscopic clamps. We circumferentially divided the fat around the tumor until we isolated the parenchyma, bovied the capsule to demarcate our planned dissection, confirmed on US the tumor was within the line we demarcated, and then placed two laparoscopic bulldog clamps on the renal arteries. The Tumors were sharply excised taking care to avoid perforating the capsule and placed in an endocatch bag.   We then placed 2-0 V-lock sutures in running fashion x3 across the bed of the resection, for hemostasis and to close the collecting system - hemolock clips on both sides of the suture for applying tension. We then removed the bulldog clamps after 13:10 min of clamp time. Hemostasis was good. We then applied surgiflow into the bed, and applied a surgicel bolster which was sewn in place with additional 0-vicryl suture.  Hemostasis  was good with pneumo-pressure down. We tacked a bit of mesenteric fat to the side wall between the inferior pole of the kidney and the ureter as well with a hemolock clip.  The robot was undocked.   The specimen was extracted through the assistant port. The incisions were irrigated. The skin incisions were all closed with 4-0 Monocryl. The wounds were dressed with surgical glue. The patient was then awakened and taken to the PACU in stable condition.

## 2019-03-22 NOTE — ANESTHESIA POSTPROCEDURE EVALUATION
Anesthesia POST Procedure Evaluation    Patient: Paul Ramirez   MRN:     [id number] Gender:   male   Age:    58 year old :      1960        Preoperative Diagnosis: History Of Renal Cell Cancer   Procedure(s):  DaVinci Assisted Left Partial Nephrectomy x2, Multiple Cyst Decortications   Postop Comments: No value filed.       Anesthesia Type:  General    Reportable Event: NO     PAIN: Uncomplicated   Sign Out status: Comfortable, Well controlled pain     PONV: No PONV   Sign Out status:  No Nausea or Vomiting     Neuro/Psych: Uneventful perioperative course   Sign Out Status: Preoperative baseline; Age appropriate mentation     Airway/Resp.: Uneventful perioperative course   Sign Out Status: Non labored breathing, age appropriate RR; Resp. Status within EXPECTED Parameters     CV: Uneventful perioperative course   Sign Out status: Appropriate BP and perfusion indices; Appropriate HR/Rhythm     Disposition:   Sign Out in:  PACU  Disposition:  Phase II; Home  Recovery Course: Uneventful  Follow-Up: Not required           Last Anesthesia Record Vitals:  CRNA VITALS  3/22/2019 1132 - 3/22/2019 1232      3/22/2019             Resp Rate (observed):  2  (Abnormal)           Last PACU/Preop Vitals:  Vitals:    19 1315 19 1330 19 1345   BP: 112/81 113/79 99/67   Pulse: 64 64 68   Resp: 16 14 14   Temp: 36.4  C (97.5  F)     SpO2: 98%           Electronically Signed By: Magy Forde MD, 2019, 1:54 PM

## 2019-03-23 VITALS
HEIGHT: 67 IN | OXYGEN SATURATION: 100 % | BODY MASS INDEX: 23.15 KG/M2 | TEMPERATURE: 97.7 F | WEIGHT: 147.49 LBS | DIASTOLIC BLOOD PRESSURE: 66 MMHG | SYSTOLIC BLOOD PRESSURE: 104 MMHG | HEART RATE: 69 BPM | RESPIRATION RATE: 18 BRPM

## 2019-03-23 LAB
ANION GAP SERPL CALCULATED.3IONS-SCNC: 6 MMOL/L (ref 3–14)
BUN SERPL-MCNC: 25 MG/DL (ref 7–30)
CALCIUM SERPL-MCNC: 7.9 MG/DL (ref 8.5–10.1)
CHLORIDE SERPL-SCNC: 110 MMOL/L (ref 94–109)
CO2 SERPL-SCNC: 22 MMOL/L (ref 20–32)
CREAT SERPL-MCNC: 1.59 MG/DL (ref 0.66–1.25)
ERYTHROCYTE [DISTWIDTH] IN BLOOD BY AUTOMATED COUNT: 15.9 % (ref 10–15)
ERYTHROCYTE [DISTWIDTH] IN BLOOD BY AUTOMATED COUNT: 15.9 % (ref 10–15)
GFR SERPL CREATININE-BSD FRML MDRD: 48 ML/MIN/{1.73_M2}
GLUCOSE SERPL-MCNC: 102 MG/DL (ref 70–99)
HCT VFR BLD AUTO: 32.6 % (ref 40–53)
HCT VFR BLD AUTO: 34.1 % (ref 40–53)
HGB BLD-MCNC: 10.3 G/DL (ref 13.3–17.7)
HGB BLD-MCNC: 10.7 G/DL (ref 13.3–17.7)
MCH RBC QN AUTO: 31.5 PG (ref 26.5–33)
MCH RBC QN AUTO: 31.7 PG (ref 26.5–33)
MCHC RBC AUTO-ENTMCNC: 31.4 G/DL (ref 31.5–36.5)
MCHC RBC AUTO-ENTMCNC: 31.6 G/DL (ref 31.5–36.5)
MCV RBC AUTO: 100 FL (ref 78–100)
MCV RBC AUTO: 100 FL (ref 78–100)
PLATELET # BLD AUTO: 114 10E9/L (ref 150–450)
PLATELET # BLD AUTO: 116 10E9/L (ref 150–450)
POTASSIUM SERPL-SCNC: 3.9 MMOL/L (ref 3.4–5.3)
RBC # BLD AUTO: 3.25 10E12/L (ref 4.4–5.9)
RBC # BLD AUTO: 3.4 10E12/L (ref 4.4–5.9)
SODIUM SERPL-SCNC: 138 MMOL/L (ref 133–144)
WBC # BLD AUTO: 5.1 10E9/L (ref 4–11)
WBC # BLD AUTO: 5.6 10E9/L (ref 4–11)

## 2019-03-23 PROCEDURE — 85027 COMPLETE CBC AUTOMATED: CPT | Performed by: STUDENT IN AN ORGANIZED HEALTH CARE EDUCATION/TRAINING PROGRAM

## 2019-03-23 PROCEDURE — 80048 BASIC METABOLIC PNL TOTAL CA: CPT | Performed by: UROLOGY

## 2019-03-23 PROCEDURE — 36415 COLL VENOUS BLD VENIPUNCTURE: CPT | Performed by: STUDENT IN AN ORGANIZED HEALTH CARE EDUCATION/TRAINING PROGRAM

## 2019-03-23 PROCEDURE — 25000132 ZZH RX MED GY IP 250 OP 250 PS 637: Performed by: STUDENT IN AN ORGANIZED HEALTH CARE EDUCATION/TRAINING PROGRAM

## 2019-03-23 PROCEDURE — 36415 COLL VENOUS BLD VENIPUNCTURE: CPT | Performed by: UROLOGY

## 2019-03-23 PROCEDURE — 85027 COMPLETE CBC AUTOMATED: CPT | Performed by: UROLOGY

## 2019-03-23 PROCEDURE — 25000132 ZZH RX MED GY IP 250 OP 250 PS 637: Performed by: UROLOGY

## 2019-03-23 PROCEDURE — 25000128 H RX IP 250 OP 636: Performed by: STUDENT IN AN ORGANIZED HEALTH CARE EDUCATION/TRAINING PROGRAM

## 2019-03-23 PROCEDURE — 25800030 ZZH RX IP 258 OP 636: Performed by: STUDENT IN AN ORGANIZED HEALTH CARE EDUCATION/TRAINING PROGRAM

## 2019-03-23 RX ORDER — OXYCODONE HYDROCHLORIDE 5 MG/1
5-10 TABLET ORAL
Qty: 30 TABLET | Refills: 0 | Status: SHIPPED | OUTPATIENT
Start: 2019-03-23

## 2019-03-23 RX ADMIN — SODIUM CHLORIDE: 9 INJECTION, SOLUTION INTRAVENOUS at 10:02

## 2019-03-23 RX ADMIN — ACETAMINOPHEN 975 MG: 325 TABLET, FILM COATED ORAL at 04:21

## 2019-03-23 RX ADMIN — SIMVASTATIN 10 MG: 5 TABLET, FILM COATED ORAL at 08:56

## 2019-03-23 RX ADMIN — AMLODIPINE BESYLATE 10 MG: 10 TABLET ORAL at 08:56

## 2019-03-23 RX ADMIN — CARVEDILOL 12.5 MG: 12.5 TABLET, FILM COATED ORAL at 08:56

## 2019-03-23 RX ADMIN — OXYCODONE HYDROCHLORIDE 5 MG: 5 TABLET ORAL at 04:25

## 2019-03-23 RX ADMIN — ACETAMINOPHEN 975 MG: 325 TABLET, FILM COATED ORAL at 10:00

## 2019-03-23 RX ADMIN — SODIUM CHLORIDE 500 ML: 9 INJECTION, SOLUTION INTRAVENOUS at 01:02

## 2019-03-23 RX ADMIN — CITALOPRAM HYDROBROMIDE 20 MG: 20 TABLET ORAL at 08:56

## 2019-03-23 ASSESSMENT — PAIN DESCRIPTION - DESCRIPTORS: DESCRIPTORS: ACHING

## 2019-03-23 NOTE — PROGRESS NOTES
"Urology  Progress Note    - Required 1L bolus overnight for low urine output and soft blood pressures   - Tolerating regular diet   - Pain controlled   - BM x 1     Exam  /66 (BP Location: Left arm)   Pulse 69   Temp (P) 97.7  F (36.5  C) (Oral)   Resp (P) 18   Ht 1.702 m (5' 7.01\")   Wt 66.9 kg (147 lb 7.8 oz)   SpO2 (P) 100%   BMI 23.09 kg/m    No acute distress  Unlabored breathing  Abdomen soft, nontender, nondistended. Incisions C/D/I   Mckinney with clear urine in tubing    /200    Labs  AM labs pending    Assessment/Plan  58 year old y/o male POD#1 s/p robotic assisted partial left nephrectomy and cyst unroofing    Neuro: Continue current  for pain control  CV: Continue home medication   Pulm: incentive spirometry while awake  FEN/GI: Regular diet, MIVF @ 125/hr  Endo:No acute issues   : Discontinue mckinney catheter   Heme/ID:  Follow up AM Labs, na-op antibiotics completed   Activity: Up ad jaden   PPx: SCDs    Seen and examined with the chief resident and staff, Dr. Lane. Will discuss with Dr. Velasquez.    Frieda Phelan, PGY-4  Urology Resident     Contacting the Urology Team     Please use the following job codes to reach the Urology Team. Note that you must use an in house phone and that job codes cannot receive text pages.     On weekdays, dial [phone] (or star-star-star [phone] on the new [employer] telephones) then [phone] to reach the Adult Urology resident or PA on call    On weekdays, dial [phone] (or star-star-star [phone] on the new [employer] telephones) then [phone] to reach the Pediatric Urology resident    On weeknights and weekends, dial [phone] (or star-star-star 777 on the new [employer] telephones) then [phone] to reach the Urology resident on call (for both Adult and Pediatrics)              "

## 2019-03-23 NOTE — PLAN OF CARE
"Activity: Transfers independently.   Neuros: Alert and oriented X4.   Cardiac: /64 (BP Location: Left arm)   Pulse 65   Temp 95.8  F (35.4  C) (Oral)   Resp 15   Ht 1.702 m (5' 7.01\")   Wt 66.9 kg (147 lb 7.8 oz)   SpO2 100%   BMI 23.09 kg/m     Respiratory: Saturation at 98%RA.   GI/: Abdomen rounded/no flatus, semi firm. Low urine output.   Diet: Clear liquid diet.   Skin: Intact  Lines: Right PIV/Infusing. Left PIV/saline locked.  Incisions/Drains:  Abdominal lap site intact with no drainage.   Labs: BMP and CBC scheduled this morning.   Pain/nausea: Oxycodone with scheduled tylenol may be effective for pain.   New changes this shift: Notified Urology on-call resident about current urine output status. Now bolus order administered.  Plan: Continue with plan of care.      "

## 2019-03-23 NOTE — PLAN OF CARE
"/66 (BP Location: Left arm)   Pulse 69   Temp 97.7  F (36.5  C) (Oral)   Resp 18   Ht 1.702 m (5' 7.01\")   Wt 66.9 kg (147 lb 7.8 oz)   SpO2 100%   BMI 23.09 kg/m      Neuro: A&O x4; cooperative with cares; calls appropriately  Cardiac: WNL  Respiratory: 100% RA; denies SOB  GI/: Onofre removed; voided 100; + BS and + BM for shift  Skin: Scattered surgical scars; otherwise appropriate for ethnicity  Pain: PRN oxycodone available  LDA: 2 PIV SL and removed  Activity: Independent  Diet/Appetite: Reg diet; ate 75%  Plan: Discharging to home; reviewed discharge paperwork with pt; no further questions at this time    "

## 2019-03-23 NOTE — PROGRESS NOTES
Clinical   On Call Weekend     Patient attempted to reach [employer] to get a ride home form his hospital discharge but they are closed. Writer arranged taxi for patient - per charting he has never received a cab voucher from us before. They will meet him in the lobby - will call as soon as RN pages that patient has returned to his room. [employer] - [phone].    No other questions or concerns identified at this time.     Ryann PINO LICSW  3/23/2019    ON CALL PAGER   0800 - 1600   [phone]    ON CALL COVERAGE AFTER 1600  [phone];

## 2019-03-27 LAB — COPATH REPORT: NORMAL

## 2019-04-02 ENCOUNTER — PRE VISIT (OUTPATIENT)
Dept: UROLOGY | Facility: CLINIC | Age: 59
End: 2019-04-02

## 2019-04-02 NOTE — TELEPHONE ENCOUNTER
Chief Complaint : post op  Left renal masses and renal cysts    New Hx/Sx:renal mass    Records/Orders/Proced: na      Pt Contacted: no    At Rooming: normal

## 2019-04-05 ENCOUNTER — TELEPHONE (OUTPATIENT)
Dept: ONCOLOGY | Facility: CLINIC | Age: 59
End: 2019-04-05

## 2019-04-05 NOTE — TELEPHONE ENCOUNTER
Tobacco Treatment Team at the [facility] attempted to reach Mr. Ramirez on 4/5/2019 regarding the tobacco cessation program to help Mr. Ramirez to quit smoking. We will attempt to reach Mr. Ramirez another time.       Pt will call us back.

## 2019-04-18 ENCOUNTER — OFFICE VISIT (OUTPATIENT)
Dept: UROLOGY | Facility: CLINIC | Age: 59
End: 2019-04-18
Payer: COMMERCIAL

## 2019-04-18 VITALS
HEART RATE: 61 BPM | DIASTOLIC BLOOD PRESSURE: 91 MMHG | BODY MASS INDEX: 23.07 KG/M2 | HEIGHT: 67 IN | WEIGHT: 147 LBS | SYSTOLIC BLOOD PRESSURE: 159 MMHG

## 2019-04-18 DIAGNOSIS — C64.9 RENAL CELL CARCINOMA, UNSPECIFIED LATERALITY (H): Primary | ICD-10-CM

## 2019-04-18 ASSESSMENT — MIFFLIN-ST. JEOR: SCORE: 1455.42

## 2019-04-18 ASSESSMENT — PAIN SCALES - GENERAL: PAINLEVEL: NO PAIN (0)

## 2019-04-18 NOTE — NURSING NOTE
Chief Complaint   Patient presents with     RECHECK     post op  Left renal masses and renal cysts       Priyanka Mays MA

## 2019-04-18 NOTE — PROGRESS NOTES
Urology Clinic    Tyson Velasquez MD  Date of Service: 2019     Name: Paul Ramirez  MRN: [id number]  Age: 58 year old  : 1960  Referring provider: Amirah Mann     Assessment and Plan:    Hx of Renal Cell Cancer [id number], ISUP grade 2, s/p Robotic Partial Nephrectomy on 2019.  Overall there is not evidence of disease recurrence to date. 2019 .     Follow up in 3-6 months with Dr. Houser    CT of Abd with and without IV contrast, no oral contrast needed    Chest XR scan    BMP, CBC      Don't think the margin is real, but rather incidental.  During resection everything appeared encapsulated and completely resected.      Attestation:  This patient was seen and evaluated by me, with a scribe taking notes.  I have reviewed the note above and agree.  The physical exam and or any procedures were performed by me and the pertinant details are outlined below.       Tyson Velasquez MD  Department of Urology  [facility]      HPI:   ##Kidney Cancer Follow up##     Paul Ramirez is a very pleasant 58 year old male who presents with a history of a Renal Cell Cancer.      The histologic subtype was Papillary.    ISUP Grade 2.    Pathologic Stage [id number] see below*.    Maximal tumor dimension was 1.6 cm.    Tumor thrombus level  not applicable.    Tumor necrosis present: No.  Sarcomatoid features present: No.  Margins: positive, renal capsular margin, believed to be incidental to the extraction.   Patient is status post Robotic Partial Nephrectomy on 2019.   Tumor was on the left side.   Lymph Nodes Removed No.   Was the ipsilateral adrenal gland removed? No.  Any Chemotherapy? No.    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    ISUP Grade 3.    Pathologic Stage T1b see below*.    Maximal tumor dimension was 4.7 cm and 0.3 cm  Tumor thrombus level  not applicable.    Tumor necrosis present: No.  Sarcomatoid features present:  "No.  Patient is status post Radical Nephrectomy Open on 2012.   Tumor was on the right side.   Lymph Nodes Removed No.   Number of nodes removed 0, number of node positive for cancer 0.  Was the ipsilateral adrenal gland removed? No.  Any Chemotherapy? No.    There were not deviations from a normal post-operative course. He has been eating well and has good bowel function.     The patient was not readmitted to the hospital since post-operative discharge.    The 10-year estimated cancer specific survival is: around 95 percent, though we do not have reliable nomograms for papillary renal cell carcinoma.    Physical Exam:   BP (!) 159/91   Pulse 61   Ht 1.702 m (5' 7\")   Wt 66.7 kg (147 lb)   BMI 23.02 kg/m    Abd is soft, non-distended, incisions are healing well    Data:   Recent Kidney Function  Creatinine 1.42 on 4/16/2019     Lab Results   Component Value Date    CR 1.59 03/23/2019    CR 1.33 03/22/2019    CR 1.04 03/08/2019     Chest imaging demonstrates no evidence of recurrence  Abdominal imaging demonstrates no evidence of recurrence    Surgical pathology 03/22/2019:  FINAL DIAGNOSIS:   A. Lower pole cyst wall, excision:   - Necrotic neoplasm with papillary configuration and benign kidney   parenchyma, fibroadipose tissue fragments   - No viable tumor seen     B. Lower pole cyst wall #2, excision:   - Papillary adenoma     C. Lower pole cyst wall #3, excision:   - Multilocular cyst with focal papillary adenoma     D. Lower pole cyst wall #4, excision:   - Cyst wall with focal papillary adenoma     E. 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 Left lateral kidney, partial nephrectomy #2:   - " Papillary adenoma     Report Name: Kidney - Nephrectomy, Partial or Radical        Status: Submitted Checklist Inst: 1      Last Updated By: Manjeet Aden M.D., [facility], 03/27/2019   17:11:55   Part(s) Involved:   E: Left medial partial nephrectomy #1     Synoptic Report:     SPECIMEN     Procedure:         - Partial nephrectomy     Specimen Laterality:         - Left     TUMOR     Tumor Site:         - Lower pole     Histologic Type:         - Papillary renal cell carcinoma       Histologic Type Comments: Multiple tumors associated with renal   papillary adenomas     Histologic Grade (WHO / ISUP Grade):         - G2: Nucleoli conspicuous and eosinophilic at 400x magnification,   visible         but not prominent at 100x magnification     Tumor Size: 1.6 x 1.4 x 1.2 Centimeters (cm)     Tumor Focality:         - Multifocal     Tumor Extent       Tumor Extension:           - Tumor limited to kidney     Accessory Findings       Sarcomatoid Features:           - Not identified       Rhabdoid Features:           - Not identified       Tumor Necrosis:           - Not identified       Lymphovascular Invasion:           - Not identified     MARGINS     Margins:         - Involved by invasive carcinoma       Margin(s):           - Renal capsular margin (partial nephrectomy only)     LYMPH NODES     Regional Lymph Nodes:         - No lymph nodes submitted or found     PATHOLOGIC STAGE CLASSIFICATION (PTNM, AJCC 8TH EDITION)     TNM Descriptors:         - m (multiple primary tumors)     Primary Tumor (pT):         - pT1a     Regional Lymph Nodes (pN):         - pNX     ADDITIONAL FINDINGS     Pathologic Findings in Nonneoplastic Kidney:         - Insufficient tissue     Additional Pathological Findings:         - Tubular (papillary) adenoma(s)     Scribe Disclosure:  Lotus TAI, am serving as a scribe to document services personally performed by Tyson Velasquez MD at this visit, based upon the  provider's statements to me. All documentation has been reviewed by the aforementioned provider prior to being entered into the official medical record.     Darryl Velasquez MD      *AJCC/pTNM staging 2017 where   T1a (<=4cm), T1b  (4-7cm)  T2a (7-10 cm), T2b (>10 cm, but limited to kidney)   T3a (renal vein thrombus, perirenal or renal sinus fat invasion)   T3b (tumor thrombus to IVC, below diaphragm)  T3c (tumor thrombus to IVC, above diaphragm)   T4 (tumor invades beyond Gerota's fascia)  Nx regional nodes not assessed  N0 No lymph node metastasis  N1 Lymph node metastasis  M0 No distant Mets  M1 Distant Metastasis

## 2019-04-18 NOTE — LETTER
RE: Paul Ramirez  [address]  [place] [address]     Dear Colleague,    Thank you for referring your patient, Paul Ramirez, to the [facility] UROLOGY AND INST FOR PROSTATE AND UROLOGIC CANCERS at [facility] Please see a copy of my visit note below.    Tyson Velasquez MD  Date of Service: 2019     Name: Paul Ramirez  MRN: [id number]  Age: 58 year old  : 1960  Referring provider: Amirah Mann     Assessment and Plan:    Hx of Renal Cell Cancer [id number], ISUP grade 2, s/p Robotic Partial Nephrectomy on 2019.  Overall there is not evidence of disease recurrence to date. 2019 .     Follow up in 3-6 months with Dr. Houser    CT of Abd with and without IV contrast, no oral contrast needed    Chest XR scan    BMP, CBC      Don't think the margin is real, but rather incidental.  During resection everything appeared encapsulated and completely resected.      Attestation:  This patient was seen and evaluated by me, with a scribe taking notes.  I have reviewed the note above and agree.  The physical exam and or any procedures were performed by me and the pertinant details are outlined below.       Tyson Velasquez MD  Department of Urology  [facility]      HPI:   ##Kidney Cancer Follow up##     Paul Ramirez is a very pleasant 58 year old male who presents with a history of a Renal Cell Cancer.      The histologic subtype was Papillary.    ISUP Grade 2.    Pathologic Stage [id number] see below*.    Maximal tumor dimension was 1.6 cm.    Tumor thrombus level  not applicable.    Tumor necrosis present: No.  Sarcomatoid features present: No.  Margins: positive, renal capsular margin, believed to be incidental to the extraction.   Patient is status post Robotic Partial Nephrectomy on 2019.   Tumor was on the left side.   Lymph Nodes Removed No.   Was the ipsilateral adrenal gland removed? No.  Any  "Chemotherapy? No.    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    ISUP Grade 3.    Pathologic Stage T1b see below*.    Maximal tumor dimension was 4.7 cm and 0.3 cm  Tumor thrombus level  not applicable.    Tumor necrosis present: No.  Sarcomatoid features present: No.  Patient is status post Radical Nephrectomy Open on 2012.   Tumor was on the right side.   Lymph Nodes Removed No.   Number of nodes removed 0, number of node positive for cancer 0.  Was the ipsilateral adrenal gland removed? No.  Any Chemotherapy? No.    There were not deviations from a normal post-operative course. He has been eating well and has good bowel function.     The patient was not readmitted to the hospital since post-operative discharge.    The 10-year estimated cancer specific survival is: around 95 percent, though we do not have reliable nomograms for papillary renal cell carcinoma.    Physical Exam:   BP (!) 159/91   Pulse 61   Ht 1.702 m (5' 7\")   Wt 66.7 kg (147 lb)   BMI 23.02 kg/m     Abd is soft, non-distended, incisions are healing well    Data:   Recent Kidney Function  Creatinine 1.42 on 4/16/2019     Lab Results   Component Value Date    CR 1.59 03/23/2019    CR 1.33 03/22/2019    CR 1.04 03/08/2019     Chest imaging demonstrates no evidence of recurrence  Abdominal imaging demonstrates no evidence of recurrence    Surgical pathology 03/22/2019:  FINAL DIAGNOSIS:   A. Lower pole cyst wall, excision:   - Necrotic neoplasm with papillary configuration and benign kidney   parenchyma, fibroadipose tissue fragments   - No viable tumor seen     B. Lower pole cyst wall #2, excision:   - Papillary adenoma     C. Lower pole cyst wall #3, excision:   - Multilocular cyst with focal papillary adenoma     D. Lower pole cyst wall #4, excision:   - Cyst wall with focal papillary adenoma     E. 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 Left lateral kidney, partial nephrectomy #2:   - Papillary adenoma     Report Name: Kidney - Nephrectomy, Partial or Radical        Status: Submitted Checklist Inst: 1      Last Updated By: Manjeet Aden M.D., [facility], 03/27/2019   17:11:55   Part(s) Involved:   E: Left medial partial nephrectomy #1     Synoptic Report:     SPECIMEN     Procedure:         - Partial nephrectomy     Specimen Laterality:         - Left     TUMOR     Tumor Site:         - Lower pole     Histologic Type:         - Papillary renal cell carcinoma       Histologic Type Comments: Multiple tumors associated with renal   papillary adenomas     Histologic Grade (WHO / ISUP Grade):         - G2: Nucleoli conspicuous and eosinophilic at 400x magnification,   visible         but not prominent at 100x magnification     Tumor Size: 1.6 x 1.4 x 1.2 Centimeters (cm)     Tumor Focality:         - Multifocal     Tumor Extent       Tumor Extension:           - Tumor limited to kidney     Accessory Findings       Sarcomatoid Features:           - Not identified       Rhabdoid Features:           - Not identified       Tumor Necrosis:           - Not identified       Lymphovascular Invasion:           - Not identified     MARGINS     Margins:         - Involved by invasive carcinoma       Margin(s):           - Renal capsular margin (partial nephrectomy only)     LYMPH NODES     Regional Lymph Nodes:         - No lymph nodes submitted or found     PATHOLOGIC STAGE CLASSIFICATION (PTNM, AJCC 8TH EDITION)     TNM Descriptors:         - m (multiple primary tumors)     Primary Tumor (pT):         - pT1a     Regional Lymph Nodes (pN):         - pNX     ADDITIONAL FINDINGS     Pathologic Findings in Nonneoplastic  Kidney:         - Insufficient tissue     Additional Pathological Findings:         - Tubular (papillary) adenoma(s)     Scribe Disclosure:  I, Lotus Neely, am serving as a scribe to document services personally performed by Tyson Velasquez MD at this visit, based upon the provider's statements to me. All documentation has been reviewed by the aforementioned provider prior to being entered into the official medical record.     Darryl Velasquez MD    *AJCC/pTNM staging 2017 where   T1a (<=4cm), T1b  (4-7cm)  T2a (7-10 cm), T2b (>10 cm, but limited to kidney)   T3a (renal vein thrombus, perirenal or renal sinus fat invasion)   T3b (tumor thrombus to IVC, below diaphragm)  T3c (tumor thrombus to IVC, above diaphragm)   T4 (tumor invades beyond Gerota's fascia)  Nx regional nodes not assessed  N0 No lymph node metastasis  N1 Lymph node metastasis  M0 No distant Mets  M1 Distant Metastasis    Again, thank you for allowing me to participate in the care of your patient.      Sincerely,    Tyson Velasquez MD

## 2019-09-19 NOTE — TELEPHONE ENCOUNTER
Pt requests we call back in 3 mo. Isn't ready at this time.    Tobacco Treatment Program at the [facility] attempted to reach Mr. Ramirez on 9/19/2019 regarding the tobacco cessation program to help Mr. Ramirez to quit smoking. We will attempt to reach Mr. Ramirez another time.     [facility]vas [facility]  Tobacco Treatment Specialist  PH: [phone]

## 2020-04-29 NOTE — TELEPHONE ENCOUNTER
Tobacco Treatment Program at the [facility] attempted to reach Mr. Ramirez on 4/29/2020 regarding the tobacco cessation program to help Mr. Ramirez to quit smoking. We will attempt to reach Mr. Ramirez another time.     [facility]  [facility]ob[facility] Treatment Specialist  PH: [phone]

## 2023-01-01 ENCOUNTER — LAB REQUISITION (OUTPATIENT)
Dept: LAB | Facility: CLINIC | Age: 63
End: 2023-01-01

## 2023-01-01 PROCEDURE — 88313 SPECIAL STAINS GROUP 2: CPT | Mod: TC | Performed by: SPECIALIST

## (undated) DEVICE — LINEN TOWEL PACK X6 WHITE 5487

## (undated) DEVICE — SU VICRYL 0 UR-6 27" J603H

## (undated) DEVICE — GLOVE PROTEXIS W/NEU-THERA 7.5  2D73TE75

## (undated) DEVICE — TAPE CLOTH 3" CARDINAL 3TRCL03

## (undated) DEVICE — DAVINCI HOT SHEARS TIP COVER  400180

## (undated) DEVICE — DAVINCI XI FCP BIPOLAR FENESTRATED 470205

## (undated) DEVICE — SOL NACL 0.9% IRRIG 1000ML BOTTLE 2F7124

## (undated) DEVICE — SU VICRYL 2-0 CT-1 27" UND J259H

## (undated) DEVICE — DAVINCI XI DRAPE ARM 470015

## (undated) DEVICE — CLIP ENDO HEMO-LOC PURPLE LG 544240

## (undated) DEVICE — DAVINCI XI MONOPOLAR SCISSORS HOT SHEARS 8MM 470179

## (undated) DEVICE — DAVINCI XI DRAPE COLUMN 470341

## (undated) DEVICE — TUBING CONMED AIRSEAL SMOKE EVAC INSUFFLATION ASM-EVAC

## (undated) DEVICE — STPL SKIN 35W ROTATING HEAD PRW35

## (undated) DEVICE — PREP CHLORAPREP 26ML TINTED ORANGE  260815

## (undated) DEVICE — BLADE CLIPPER SGL USE 9680

## (undated) DEVICE — DAVINCI XI GRASPER ENDOWRIST PROGRASP 470093

## (undated) DEVICE — SUCTION MANIFOLD DORNOCH ULTRA CART UL-CL500

## (undated) DEVICE — PACK GOWN 3/PK DISP XL SBA32GPFCB

## (undated) DEVICE — DAVINCI XI NDL DRIVER MEGA SUTURE CUT 8MM 470309

## (undated) DEVICE — DRAPE IOBAN INCISE 23X17" 6650EZ

## (undated) DEVICE — PACK DAVINCI UROL

## (undated) DEVICE — SYSTEM CLEARIFY VISUALIZATION 21-345

## (undated) DEVICE — DRAPE U SPLIT 74X120" 29440

## (undated) DEVICE — SU STRATAFIX PDS PLUS 3-0 SPIRAL SH 15CM SXPP1B420

## (undated) DEVICE — ADH FLOSEAL W/HUMAN THROMBIN 5ML 1501825

## (undated) DEVICE — LINEN TOWEL PACK X30 5481

## (undated) DEVICE — SU DERMABOND ADVANCED .7ML DNX12

## (undated) DEVICE — STRAP UNIVERSAL POSITIONING 2-PIECE 4X47X76" 91-287

## (undated) DEVICE — ENDO TROCAR CONMED AIRSEAL BLADELESS 12X120MM IAS12-120LP

## (undated) DEVICE — SOL WATER IRRIG 1000ML BOTTLE 2F7114

## (undated) DEVICE — BLADE SWITCH SCISSORS TIP 5MM 89-5100B

## (undated) DEVICE — ENDO POUCH UNIV RETRIEVAL SYSTEM INZII 10MM CD001

## (undated) DEVICE — DAVINCI XI SEAL UNIVERSAL 5-8MM 470361

## (undated) DEVICE — SURGICEL HEMOSTAT 4X8" 1952

## (undated) DEVICE — SU MONOCRYL 4-0 PS-2 27" UND Y426H

## (undated) DEVICE — SOL NACL 0.9% INJ 1000ML BAG 07983-09

## (undated) RX ORDER — HYDRALAZINE HYDROCHLORIDE 20 MG/ML
INJECTION INTRAMUSCULAR; INTRAVENOUS
Status: DISPENSED
Start: 2019-03-22

## (undated) RX ORDER — DIPHENHYDRAMINE HYDROCHLORIDE 50 MG/ML
INJECTION INTRAMUSCULAR; INTRAVENOUS
Status: DISPENSED
Start: 2019-03-22

## (undated) RX ORDER — FENTANYL CITRATE 50 UG/ML
INJECTION, SOLUTION INTRAMUSCULAR; INTRAVENOUS
Status: DISPENSED
Start: 2019-03-22

## (undated) RX ORDER — TRAMADOL HYDROCHLORIDE 50 MG/1
TABLET ORAL
Status: DISPENSED
Start: 2019-03-22

## (undated) RX ORDER — GABAPENTIN 300 MG/1
CAPSULE ORAL
Status: DISPENSED
Start: 2019-03-22

## (undated) RX ORDER — GABAPENTIN 100 MG/1
CAPSULE ORAL
Status: DISPENSED
Start: 2019-03-22

## (undated) RX ORDER — CEFAZOLIN SODIUM 2 G/100ML
INJECTION, SOLUTION INTRAVENOUS
Status: DISPENSED
Start: 2019-03-22

## (undated) RX ORDER — BUPIVACAINE HYDROCHLORIDE 2.5 MG/ML
INJECTION, SOLUTION EPIDURAL; INFILTRATION; INTRACAUDAL
Status: DISPENSED
Start: 2019-03-22

## (undated) RX ORDER — SODIUM CHLORIDE 9 MG/ML
INJECTION, SOLUTION INTRAVENOUS
Status: DISPENSED
Start: 2019-03-22

## (undated) RX ORDER — ONDANSETRON 2 MG/ML
INJECTION INTRAMUSCULAR; INTRAVENOUS
Status: DISPENSED
Start: 2019-03-22

## (undated) RX ORDER — HYDROMORPHONE HYDROCHLORIDE 1 MG/ML
INJECTION, SOLUTION INTRAMUSCULAR; INTRAVENOUS; SUBCUTANEOUS
Status: DISPENSED
Start: 2019-03-22

## (undated) RX ORDER — DIMENHYDRINATE 50 MG/ML
INJECTION, SOLUTION INTRAMUSCULAR; INTRAVENOUS
Status: DISPENSED
Start: 2019-03-22